# Patient Record
Sex: FEMALE | Race: BLACK OR AFRICAN AMERICAN | NOT HISPANIC OR LATINO | Employment: OTHER | URBAN - METROPOLITAN AREA
[De-identification: names, ages, dates, MRNs, and addresses within clinical notes are randomized per-mention and may not be internally consistent; named-entity substitution may affect disease eponyms.]

---

## 2023-04-05 ENCOUNTER — HOSPITAL ENCOUNTER (OUTPATIENT)
Dept: DATA CONVERSION | Facility: HOSPITAL | Age: 88
End: 2023-04-05
Attending: OPHTHALMOLOGY | Admitting: OPHTHALMOLOGY
Payer: COMMERCIAL

## 2023-04-05 DIAGNOSIS — Z86.19 PERSONAL HISTORY OF OTHER INFECTIOUS AND PARASITIC DISEASES: ICD-10-CM

## 2023-04-05 DIAGNOSIS — Z85.038 PERSONAL HISTORY OF OTHER MALIGNANT NEOPLASM OF LARGE INTESTINE: ICD-10-CM

## 2023-04-05 DIAGNOSIS — Z85.3 PERSONAL HISTORY OF MALIGNANT NEOPLASM OF BREAST: ICD-10-CM

## 2023-04-05 DIAGNOSIS — F41.9 ANXIETY DISORDER, UNSPECIFIED: ICD-10-CM

## 2023-04-05 DIAGNOSIS — I10 ESSENTIAL (PRIMARY) HYPERTENSION: ICD-10-CM

## 2023-04-05 DIAGNOSIS — I25.2 OLD MYOCARDIAL INFARCTION: ICD-10-CM

## 2023-04-05 DIAGNOSIS — E78.5 HYPERLIPIDEMIA, UNSPECIFIED: ICD-10-CM

## 2023-04-05 DIAGNOSIS — E44.0 MODERATE PROTEIN-CALORIE MALNUTRITION (MULTI): ICD-10-CM

## 2023-04-05 DIAGNOSIS — H26.9 UNSPECIFIED CATARACT: ICD-10-CM

## 2023-04-05 DIAGNOSIS — I25.10 ATHEROSCLEROTIC HEART DISEASE OF NATIVE CORONARY ARTERY WITHOUT ANGINA PECTORIS: ICD-10-CM

## 2023-04-05 DIAGNOSIS — R42 DIZZINESS AND GIDDINESS: ICD-10-CM

## 2023-04-05 DIAGNOSIS — Z87.891 PERSONAL HISTORY OF NICOTINE DEPENDENCE: ICD-10-CM

## 2023-04-05 DIAGNOSIS — R74.01 ELEVATION OF LEVELS OF LIVER TRANSAMINASE LEVELS: ICD-10-CM

## 2023-04-05 DIAGNOSIS — I48.91 UNSPECIFIED ATRIAL FIBRILLATION (MULTI): ICD-10-CM

## 2023-04-05 DIAGNOSIS — H25.811 COMBINED FORMS OF AGE-RELATED CATARACT, RIGHT EYE: ICD-10-CM

## 2023-09-06 VITALS
DIASTOLIC BLOOD PRESSURE: 80 MMHG | TEMPERATURE: 99.3 F | SYSTOLIC BLOOD PRESSURE: 139 MMHG | HEART RATE: 110 BPM | RESPIRATION RATE: 17 BRPM

## 2023-09-14 NOTE — H&P
History of Present Illness:   History Present Illness:  Reason for surgery: Cataract, right eye   HPI:    Ms. Luis is a 89 y/o woman with a hx of visually significant  cataract, right eye, presenting for cataract extraction with IOL placement, right eye      Allergies:        Allergies:  ·  No Known Allergies :     Home Medication Review:   Home Medications Reviewed: yes     Impression/Procedure:   ·  Impression and Planned Procedure: hx of visually significant cataract, right eye, presenting for cataract extraction with IOL placement, right eye       ERAS (Enhanced Recovery After Surgery):  ·  ERAS Patient: no       Vital Signs:  Temperature C: 37.4 degrees C   Temperature F: 99.3 degrees F   Heart Rate: 110 beats per minute   Respiratory Rate: 17 breath per minute   Blood Pressure Systolic: 139 mm/Hg   Blood Pressure Diastolic: 80 mm/Hg     Physical Exam by System:    Constitutional: Well developed, no distress, alert  and cooperative   Respiratory/Thorax: Patent airways, good chest expansion,  thorax symmetric   Cardiovascular: Regular, rate and rhythm     Consent:   COVID-19 Consent:  ·  COVID-19 Risk Consent Surgeon has reviewed key risks related to the risk of mackenzie COVID-19 and if they contract COVID-19 what the risks are.     Attestation:   Note Completion:  I am a:  Resident/Fellow   Attending Attestation I saw and evaluated the patient.  I personally obtained the key and critical portions of the history and physical exam or was physically present for key and  critical portions performed by the resident/fellow. I reviewed the resident/fellow?s documentation and discussed the patient with the resident/fellow.  I agree with the resident/fellow?s medical decision making as documented in the note.     I personally evaluated the patient on 05-Apr-2023         Electronic Signatures:  Lynette Aguilar (Resident))  (Signed 05-Apr-2023 13:48)   Authored: History of Present Illness, Allergies, Home   Medication Review, Impression/Procedure, ERAS, Physical Exam, Consent, Note Completion  Fady Goel)  (Signed 05-Apr-2023 15:03)   Authored: Physical Exam, Note Completion   Co-Signer: History of Present Illness, Allergies, Home Medication Review, Impression/Procedure, ERAS, Physical Exam, Consent, Note Completion      Last Updated: 05-Apr-2023 15:03 by Fady Goel)

## 2023-09-18 ENCOUNTER — OFFICE VISIT (OUTPATIENT)
Dept: PRIMARY CARE | Facility: CLINIC | Age: 88
End: 2023-09-18
Payer: COMMERCIAL

## 2023-09-18 ENCOUNTER — LAB (OUTPATIENT)
Dept: LAB | Facility: LAB | Age: 88
End: 2023-09-18
Payer: COMMERCIAL

## 2023-09-18 VITALS
SYSTOLIC BLOOD PRESSURE: 149 MMHG | TEMPERATURE: 98 F | OXYGEN SATURATION: 98 % | DIASTOLIC BLOOD PRESSURE: 84 MMHG | HEIGHT: 64 IN | BODY MASS INDEX: 21.72 KG/M2 | HEART RATE: 76 BPM | WEIGHT: 127.2 LBS

## 2023-09-18 DIAGNOSIS — R20.0 NUMBNESS IN FEET: ICD-10-CM

## 2023-09-18 DIAGNOSIS — Z23 ENCOUNTER FOR IMMUNIZATION: Primary | ICD-10-CM

## 2023-09-18 DIAGNOSIS — I10 HYPERTENSION, UNSPECIFIED TYPE: ICD-10-CM

## 2023-09-18 LAB — COBALAMIN (VITAMIN B12) (PG/ML) IN SER/PLAS: 325 PG/ML (ref 211–911)

## 2023-09-18 PROCEDURE — 3077F SYST BP >= 140 MM HG: CPT

## 2023-09-18 PROCEDURE — 90662 IIV NO PRSV INCREASED AG IM: CPT

## 2023-09-18 PROCEDURE — 36415 COLL VENOUS BLD VENIPUNCTURE: CPT

## 2023-09-18 PROCEDURE — 99213 OFFICE O/P EST LOW 20 MIN: CPT

## 2023-09-18 PROCEDURE — 1126F AMNT PAIN NOTED NONE PRSNT: CPT

## 2023-09-18 PROCEDURE — 82607 VITAMIN B-12: CPT

## 2023-09-18 PROCEDURE — 3079F DIAST BP 80-89 MM HG: CPT

## 2023-09-18 PROCEDURE — 1036F TOBACCO NON-USER: CPT

## 2023-09-18 PROCEDURE — 90471 IMMUNIZATION ADMIN: CPT

## 2023-09-18 PROCEDURE — 1159F MED LIST DOCD IN RCRD: CPT

## 2023-09-18 RX ORDER — AMLODIPINE BESYLATE 5 MG/1
5 TABLET ORAL DAILY
Qty: 90 TABLET | Refills: 11 | Status: SHIPPED | OUTPATIENT
Start: 2023-09-18 | End: 2023-09-18

## 2023-09-18 RX ORDER — AMLODIPINE BESYLATE 5 MG/1
1 TABLET ORAL DAILY
COMMUNITY
Start: 2020-08-19 | End: 2023-09-18 | Stop reason: SDUPTHER

## 2023-09-18 ASSESSMENT — PAIN SCALES - GENERAL: PAINLEVEL: 0-NO PAIN

## 2023-09-18 NOTE — PROGRESS NOTES
".Subjective   Patient ID: Jojo Luis is a 89 y.o. female who presents for Follow-up (Med refill).    HPI     # HTN   - IO BP today is 149/84   - previously controlled on amlodipine 5 mg   - per pt she ran out of her mediation 1.5 weeks ago as she was not able to get a refill   - Denies cp, SOB, dizziness, change in vision, headache, n/v, or changes in urination.     # b/l lower extremity edema   - pt complains of some mild edema at ankles. She states that she used to wear compression stockings but does not wear them anymore. Her daughter will get her some. Pt defers from getting a prescription.   - She denies pain but does endorse some numbness at bilateral ankles.     Review of Systems  12 point ROS negative except as stated in HPI.     Objective   /84   Pulse 76   Temp 36.7 °C (98 °F) (Temporal)   Ht 1.626 m (5' 4\")   Wt 57.7 kg (127 lb 3.2 oz)   SpO2 98%   BMI 21.83 kg/m²     Physical Exam  Gen: NAD, nontox  HEENT: NCAT. EOMI. MMM.  RESP: no resp distress, talks in full sentences, CTABL  CVS: non-tachycardic, RRR  ABD: Soft, non-distended  Psych: appropriately answers questions. normal mood and affect  MSK: appears to have Full range of motion on all extremities. Mild non-pitting edema at bilateral ankles, no skin changes appreciated.       Assessment/Plan   87 years old Female with PMHx significant for previously well controlled HTN who comes to the clinic for elevated BP after not having her amlodipine for 1.5 weeks. Pt is asymptomatic but she does complain of some dependent edema bilaterally likely 2/2 vascular insufficiency.     # HTN  - refilled amlodipine 5mg   - counseled pt on taking her blood pressure 2x in the morning and at night before her medications and bringing the recordings to her next appointment.     #vascular insufficiency   # mild peripheral neuropathy at b/l lateral malleolus.   - encouraged the use of compression stockings. Pt prefers to buy them herself instead of getting a " prescription.   - counseled the patient on elevating her legs when sitting or sleeping.   - B12 level obtained     #HM   - flu shot administered.     Seen and discussed with Dr. Sierra Saha MD, MPH   Family Medicine and Preventive Health, PGY-2

## 2023-09-18 NOTE — PATIENT INSTRUCTIONS
Thank you for coming in to see us today, Ms. Jojo Luis! It was a pleasure managing your health together.    Today in clinic, we discussed your blood pressure which was elevated. However you have not had your BP meds in 1.5 weeks.     If we ordered bloodwork today, you can get this done at ANY  location and the results will come to me directly. The [facility] here at [facility] are walk-in (no appointment needed); [facility]'s hours are M-F 6:30a-6p and Sat 8a-12p.    If you have any questions/concerns, you can always use i[employer] to message me directly. Or if you prefer, you can call the office at [phone]; if you leave a message, the office staff should translate this to a message in my inbox.    I'm looking forward to seeing you back in clinic in 3 months to discuss your health maintenance.    Best,  Dr. Saha

## 2023-09-29 NOTE — PROGRESS NOTES
I reviewed the resident's documentation and discussed the patient with the resident. I agree with the resident's medical decision making as documented in the note.     Cielo Esquivel MD

## 2023-10-02 NOTE — OP NOTE
Post Operative Note:     PreOp Diagnosis: Cataract, right eye   Post-Procedure Diagnosis: Cataract, right eye   Procedure: 1. Cataract extraction and PCIOL insertion,  right eye   Surgeon: Dr. Goel   Resident/Fellow/Other Assistant: Dr. Aguilar   Anesthesia: MAC   Estimated Blood Loss (mL): none   Specimen: no   Complications: None   Findings: Cataract, right eye, PCIOL in proper position   Patient Returned To/Condition: PACU/Good     Operative Report Dictated:  Dictation: not applicable - note contains Operative  Report    Operative Report:    The patient was placed in the supine position on the operating room table where appropriate blood pressure and cardiac monitoring were initiated. The patient was  prepped and draped in the usual sterile fashion for intraocular surgery. This included instillation of Betadine 5% onto the ocular surface followed by irrigation with balance salt solution a minute or two later. A lid speculum was placed and the operating  microscope was positioned. One paracentesis stab incision was made to the left of the planned cataract incision with a 15-degree supersharp blade. 1 ml of preservative free lidocaine was injected into the AC. Viscoat was used to replace the aqueous humor.  A temporal clear corneal wound was fashioned beginning at the limbus with a 2.2 mm keratome, extending 2 mm into clear cornea before entering the anterior chamber. A continuous tear circular capsulorhexis of approximately 5 mm in diameter was performed.  Hydrodissection was performed using a Munoz canula. The endothelium was coated with viscoat again. Using the Ozil handpiece on the Asseta Lens Removal System, the nucleus was emulsified and aspirated using a divide-and-conquer technique. Residual cortex  was removed from the eye with the irrigation/aspiration bimanually. ProVisc was used to inflate the capsular bag. The lens implant was inspected and found to be free of visible defects. The lens used was  an Seven model AU00T0, power 21.5 diopter intraocular  lens. The lens was inserted into the capsular bag using the Broadview insertion mechanism. The lens was centered with a Slade hook. Residual viscoelastic was removed from the eye with the irrigation/aspiration instrument. Preservative free moxifloxacin  was injected  intracameral. The wounds were checked and found to be watertight. The lid speculum was removed and the eye was dressed with Maxitrol ointment, eye pad, tape, and shield. The patient tolerated the procedure well, and there were no complications.        Attestation:   Note Completion:  I am a: Resident/Fellow   Attending Attestation I was present for the entire procedure          Electronic Signatures:  Lynette Aguilar (Resident))  (Signed 05-Apr-2023 15:04)   Authored: Post Operative Note, Note Completion  Fady Goel)  (Signed 05-Apr-2023 15:05)   Authored: Post Operative Note, Note Completion   Co-Signer: Post Operative Note, Note Completion      Last Updated: 05-Apr-2023 15:05 by Fady Goel)

## 2023-10-24 PROBLEM — Z17.1 MALIGNANT NEOPLASM OF UPPER-OUTER QUADRANT OF RIGHT BREAST IN FEMALE, ESTROGEN RECEPTOR NEGATIVE (MULTI): Status: ACTIVE | Noted: 2023-10-24

## 2023-10-24 PROBLEM — H35.3130 AGE-RELATED MACULAR DEGENERATION, DRY, BOTH EYES: Status: ACTIVE | Noted: 2023-10-24

## 2023-10-24 PROBLEM — Z79.899 HIGH RISK MEDICATION USE: Status: ACTIVE | Noted: 2023-10-24

## 2023-10-24 PROBLEM — F41.9 ANXIETY DISORDER: Status: ACTIVE | Noted: 2023-10-24

## 2023-10-24 PROBLEM — H26.20 COMPLICATED CATARACT: Status: ACTIVE | Noted: 2023-10-24

## 2023-10-24 PROBLEM — H35.3221 EXUDATIVE AGE-RELATED MACULAR DEGENERATION, LEFT EYE, WITH ACTIVE CHOROIDAL NEOVASCULARIZATION (MULTI): Status: ACTIVE | Noted: 2023-10-24

## 2023-10-24 PROBLEM — H52.7 REFRACTIVE ERROR: Status: ACTIVE | Noted: 2023-10-24

## 2023-10-24 PROBLEM — I65.23 ASYMPTOMATIC BILATERAL CAROTID ARTERY STENOSIS: Status: ACTIVE | Noted: 2023-10-24

## 2023-10-24 PROBLEM — H25.811 COMBINED FORM OF AGE-RELATED CATARACT, RIGHT EYE: Status: ACTIVE | Noted: 2023-10-24

## 2023-10-24 PROBLEM — H52.203 ASTIGMATISM OF BOTH EYES WITH PRESBYOPIA: Status: ACTIVE | Noted: 2023-10-24

## 2023-10-24 PROBLEM — H91.93 BILATERAL HEARING LOSS: Status: ACTIVE | Noted: 2023-10-24

## 2023-10-24 PROBLEM — Z96.1 PSEUDOPHAKIA OF BOTH EYES: Status: ACTIVE | Noted: 2023-10-24

## 2023-10-24 PROBLEM — G89.29 CHRONIC PAIN: Status: ACTIVE | Noted: 2023-10-24

## 2023-10-24 PROBLEM — H90.6 MIXED HEARING LOSS, BILATERAL: Status: ACTIVE | Noted: 2023-10-24

## 2023-10-24 PROBLEM — B18.2 CHRONIC HEPATITIS C (MULTI): Status: ACTIVE | Noted: 2023-10-24

## 2023-10-24 PROBLEM — H25.812 COMBINED FORM OF AGE-RELATED CATARACT, LEFT EYE: Status: ACTIVE | Noted: 2023-10-24

## 2023-10-24 PROBLEM — H90.3 BILATERAL SENSORINEURAL HEARING LOSS: Status: ACTIVE | Noted: 2023-10-24

## 2023-10-24 PROBLEM — H52.4 ASTIGMATISM OF BOTH EYES WITH PRESBYOPIA: Status: ACTIVE | Noted: 2023-10-24

## 2023-10-24 PROBLEM — I10 HTN (HYPERTENSION): Status: ACTIVE | Noted: 2023-10-24

## 2023-10-24 PROBLEM — E78.5 HYPERLIPIDEMIA: Status: ACTIVE | Noted: 2023-10-24

## 2023-10-24 PROBLEM — C50.411 MALIGNANT NEOPLASM OF UPPER-OUTER QUADRANT OF RIGHT BREAST IN FEMALE, ESTROGEN RECEPTOR NEGATIVE (MULTI): Status: ACTIVE | Noted: 2023-10-24

## 2023-10-24 PROBLEM — I80.8 THROMBOPHLEBITIS ARM: Status: ACTIVE | Noted: 2023-10-24

## 2023-10-24 PROBLEM — H43.819 POSTERIOR VITREOUS DEGENERATION: Status: ACTIVE | Noted: 2023-10-24

## 2023-10-24 PROBLEM — H90.5 RIGHT-SIDED SENSORINEURAL HEARING LOSS: Status: ACTIVE | Noted: 2023-10-24

## 2023-10-24 RX ORDER — ACETAMINOPHEN AND CODEINE PHOSPHATE 300; 30 MG/1; MG/1
TABLET ORAL 2 TIMES DAILY
COMMUNITY
Start: 2018-04-13

## 2023-10-24 RX ORDER — LORAZEPAM 0.5 MG/1
0.5 TABLET ORAL EVERY 12 HOURS
COMMUNITY

## 2023-10-24 RX ORDER — ASPIRIN 81 MG
5 TABLET, DELAYED RELEASE (ENTERIC COATED) ORAL 2 TIMES DAILY
COMMUNITY
Start: 2022-09-12

## 2023-10-25 ENCOUNTER — OFFICE VISIT (OUTPATIENT)
Dept: OPHTHALMOLOGY | Facility: CLINIC | Age: 88
End: 2023-10-25
Payer: COMMERCIAL

## 2023-10-25 DIAGNOSIS — H35.3221 EXUDATIVE AGE-RELATED MACULAR DEGENERATION, LEFT EYE, WITH ACTIVE CHOROIDAL NEOVASCULARIZATION (MULTI): Primary | ICD-10-CM

## 2023-10-25 DIAGNOSIS — H35.3131 EARLY DRY STAGE NONEXUDATIVE AGE-RELATED MACULAR DEGENERATION OF BOTH EYES: ICD-10-CM

## 2023-10-25 PROCEDURE — 92134 CPTRZ OPH DX IMG PST SGM RTA: CPT | Mod: BILATERAL PROCEDURE | Performed by: OPHTHALMOLOGY

## 2023-10-25 PROCEDURE — 99214 OFFICE O/P EST MOD 30 MIN: CPT | Performed by: OPHTHALMOLOGY

## 2023-10-25 PROCEDURE — 67028 INJECTION EYE DRUG: CPT | Mod: LEFT SIDE | Performed by: OPHTHALMOLOGY

## 2023-10-25 ASSESSMENT — VISUAL ACUITY
OS_CC: 20/60
METHOD: SNELLEN - LINEAR
OD_CC: 20/20
CORRECTION_TYPE: GLASSES

## 2023-10-25 ASSESSMENT — CUP TO DISC RATIO
OS_RATIO: 0.2
OD_RATIO: 0.2

## 2023-10-25 ASSESSMENT — EXTERNAL EXAM - LEFT EYE: OS_EXAM: NORMAL

## 2023-10-25 ASSESSMENT — ENCOUNTER SYMPTOMS
CONSTITUTIONAL NEGATIVE: 0
MUSCULOSKELETAL NEGATIVE: 0
ALLERGIC/IMMUNOLOGIC NEGATIVE: 0
RESPIRATORY NEGATIVE: 0
ENDOCRINE NEGATIVE: 0
PSYCHIATRIC NEGATIVE: 0
GASTROINTESTINAL NEGATIVE: 0
EYES NEGATIVE: 0
HEMATOLOGIC/LYMPHATIC NEGATIVE: 0
CARDIOVASCULAR NEGATIVE: 0
NEUROLOGICAL NEGATIVE: 0

## 2023-10-25 ASSESSMENT — SLIT LAMP EXAM - LIDS
COMMENTS: NORMAL
COMMENTS: NORMAL

## 2023-10-25 ASSESSMENT — TONOMETRY
IOP_METHOD: GOLDMANN APPLANATION
OD_IOP_MMHG: 11
OS_IOP_MMHG: 11

## 2023-10-25 ASSESSMENT — EXTERNAL EXAM - RIGHT EYE: OD_EXAM: NORMAL

## 2023-10-25 NOTE — PROGRESS NOTES
OCT Macula    OD: normal foveal depression, no evidence of retinal edema, + RPE disruption and pigment migration         OS: minimal disruption of foveal contour with partially collapsed ped, improving mild but still present subretinal fluid (SRF), CST improved    Dry AMD OD  -Dry and stable, no evidence of fluid as before  -PCIOL in good position, well centered okay   -Pt encouraged to take AREDS2    Wet AMD OS   - Discussed with pt that we will tighten interval for treat and extend protocol. Improving SRF on OCT macula OS today s/p FRANKLYN OS 8/9/23  - R/B/A of REPEAT FRANKLYN OS discussed. Patient wishes to proceed. FRANKLYN OS again performed today, procedure note to follow.  - Continue amsler monitoring  - f/u in 2 months

## 2023-12-20 ENCOUNTER — PHARMACY VISIT (OUTPATIENT)
Dept: PHARMACY | Facility: CLINIC | Age: 88
End: 2023-12-20
Payer: MEDICAID

## 2023-12-20 PROCEDURE — RXMED WILLOW AMBULATORY MEDICATION CHARGE

## 2023-12-26 NOTE — PROGRESS NOTES
Assessment/Plan   Diagnoses and all orders for this visit:  Exudative age-related macular degeneration, left eye, with active choroidal neovascularization (CMS/HCC)  Early dry stage nonexudative age-related macular degeneration of right eye  Intermediate stage nonexudative age-related macular degeneration of right eye    OCT Macula 12/27/23    OD: normal foveal depression, no evidence of retinal edema, focal + RPE disruption and pigment migration         OS: Abnormal foveal contour, subfoveal broad partially collapsed PED with overlying subretinal fluid (SRF)- mildly improved    Wet AMD OS  H35.3221  - s/p FRANKLYN OS 08/09/23, 10/25/23, 12/27/23  - Discussed with pt that we will tighten interval for treat and extend protocol. Improving SRF on OCT macula OS today  -r/b of FRANKLYN discussed today, patient wished to proceed  - Eylea given today (12/27/23) , tolerated well   - Continue amsler monitoring  - will again try to shorten interval to 6-8 weeks    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

## 2023-12-27 ENCOUNTER — OFFICE VISIT (OUTPATIENT)
Dept: OPHTHALMOLOGY | Facility: CLINIC | Age: 88
End: 2023-12-27
Payer: COMMERCIAL

## 2023-12-27 DIAGNOSIS — H35.3221 EXUDATIVE AGE-RELATED MACULAR DEGENERATION, LEFT EYE, WITH ACTIVE CHOROIDAL NEOVASCULARIZATION (MULTI): Primary | ICD-10-CM

## 2023-12-27 DIAGNOSIS — H35.3112 INTERMEDIATE STAGE NONEXUDATIVE AGE-RELATED MACULAR DEGENERATION OF RIGHT EYE: ICD-10-CM

## 2023-12-27 DIAGNOSIS — H35.3111 EARLY DRY STAGE NONEXUDATIVE AGE-RELATED MACULAR DEGENERATION OF RIGHT EYE: ICD-10-CM

## 2023-12-27 PROCEDURE — 92134 CPTRZ OPH DX IMG PST SGM RTA: CPT | Mod: BILATERAL PROCEDURE | Performed by: OPHTHALMOLOGY

## 2023-12-27 PROCEDURE — 67028 INJECTION EYE DRUG: CPT | Mod: LEFT SIDE | Performed by: OPHTHALMOLOGY

## 2023-12-27 PROCEDURE — 99213 OFFICE O/P EST LOW 20 MIN: CPT | Performed by: OPHTHALMOLOGY

## 2023-12-27 ASSESSMENT — VISUAL ACUITY
OS_CC: 20/50+2
METHOD: SNELLEN - LINEAR
CORRECTION_TYPE: GLASSES
OD_CC: 20/20-2

## 2023-12-27 ASSESSMENT — CUP TO DISC RATIO
OS_RATIO: 0.2
OD_RATIO: 0.2

## 2023-12-27 ASSESSMENT — TONOMETRY
IOP_METHOD: GOLDMANN APPLANATION
OD_IOP_MMHG: 12
OS_IOP_MMHG: 12

## 2023-12-27 ASSESSMENT — EXTERNAL EXAM - LEFT EYE: OS_EXAM: NORMAL

## 2023-12-27 ASSESSMENT — SLIT LAMP EXAM - LIDS
COMMENTS: NORMAL
COMMENTS: NORMAL

## 2023-12-27 ASSESSMENT — EXTERNAL EXAM - RIGHT EYE: OD_EXAM: NORMAL

## 2024-01-13 ENCOUNTER — PHARMACY VISIT (OUTPATIENT)
Dept: PHARMACY | Facility: CLINIC | Age: 89
End: 2024-01-13
Payer: MEDICAID

## 2024-01-13 ENCOUNTER — HOSPITAL ENCOUNTER (EMERGENCY)
Facility: HOSPITAL | Age: 89
Discharge: HOME | End: 2024-01-13
Payer: COMMERCIAL

## 2024-01-13 VITALS
HEIGHT: 64 IN | RESPIRATION RATE: 16 BRPM | BODY MASS INDEX: 21.68 KG/M2 | TEMPERATURE: 98.1 F | WEIGHT: 127 LBS | SYSTOLIC BLOOD PRESSURE: 118 MMHG | HEART RATE: 93 BPM | OXYGEN SATURATION: 96 % | DIASTOLIC BLOOD PRESSURE: 78 MMHG

## 2024-01-13 DIAGNOSIS — H61.23 BILATERAL IMPACTED CERUMEN: Primary | ICD-10-CM

## 2024-01-13 PROCEDURE — 99283 EMERGENCY DEPT VISIT LOW MDM: CPT

## 2024-01-13 PROCEDURE — 69209 REMOVE IMPACTED EAR WAX UNI: CPT | Mod: 50

## 2024-01-13 PROCEDURE — RXMED WILLOW AMBULATORY MEDICATION CHARGE

## 2024-01-13 PROCEDURE — 99283 EMERGENCY DEPT VISIT LOW MDM: CPT | Performed by: NURSE PRACTITIONER

## 2024-01-13 PROCEDURE — 99282 EMERGENCY DEPT VISIT SF MDM: CPT

## 2024-01-13 RX ORDER — ASPIRIN 81 MG
5 TABLET, DELAYED RELEASE (ENTERIC COATED) ORAL 2 TIMES DAILY
Qty: 15 ML | Refills: 0 | OUTPATIENT
Start: 2024-01-13 | End: 2024-02-12

## 2024-01-13 ASSESSMENT — COLUMBIA-SUICIDE SEVERITY RATING SCALE - C-SSRS
2. HAVE YOU ACTUALLY HAD ANY THOUGHTS OF KILLING YOURSELF?: NO
1. IN THE PAST MONTH, HAVE YOU WISHED YOU WERE DEAD OR WISHED YOU COULD GO TO SLEEP AND NOT WAKE UP?: NO
6. HAVE YOU EVER DONE ANYTHING, STARTED TO DO ANYTHING, OR PREPARED TO DO ANYTHING TO END YOUR LIFE?: NO

## 2024-01-13 ASSESSMENT — PAIN DESCRIPTION - ORIENTATION: ORIENTATION: RIGHT

## 2024-01-13 ASSESSMENT — LIFESTYLE VARIABLES
REASON UNABLE TO ASSESS: NO
HAVE PEOPLE ANNOYED YOU BY CRITICIZING YOUR DRINKING: NO
EVER FELT BAD OR GUILTY ABOUT YOUR DRINKING: NO
EVER HAD A DRINK FIRST THING IN THE MORNING TO STEADY YOUR NERVES TO GET RID OF A HANGOVER: NO
HAVE YOU EVER FELT YOU SHOULD CUT DOWN ON YOUR DRINKING: NO

## 2024-01-13 ASSESSMENT — PAIN DESCRIPTION - LOCATION: LOCATION: EAR

## 2024-01-13 ASSESSMENT — PAIN DESCRIPTION - PAIN TYPE: TYPE: ACUTE PAIN

## 2024-01-13 ASSESSMENT — PAIN SCALES - GENERAL: PAINLEVEL_OUTOF10: 4

## 2024-01-13 ASSESSMENT — PAIN - FUNCTIONAL ASSESSMENT: PAIN_FUNCTIONAL_ASSESSMENT: 0-10

## 2024-01-13 ASSESSMENT — PAIN DESCRIPTION - PROGRESSION: CLINICAL_PROGRESSION: NOT CHANGED

## 2024-01-13 NOTE — ED PROVIDER NOTES
"HPI   No chief complaint on file.      HPI    This is a very pleasant 89 year old with a history significant for chronic bilateral cerumen impaction among other chronic conditions presented to the ED today with \"bleeding from the right ear\" and difficulty hearing.  She has been using a drop to help with cerumen impaction which does help but over the course of the last two days, she has been poking in the right ear to get it cleared out and saw some blood when stuck a tissue in her ear. Had some ear pain last night but not now.   She has no headache or any URI symptoms. No fever and/or chills.                   Nely Coma Scale Score: 15                  Patient History   Past Medical History:   Diagnosis Date    Abnormal electrocardiogram (ECG) (EKG) 07/02/2013    Abnormal electrocardiogram    Biliuria 04/10/2017    Bilirubin in urine    Cerebral cysts 07/02/2013    Arachnoid cyst    Diverticulosis of intestine, part unspecified, without perforation or abscess without bleeding 07/02/2013    Diverticulosis    Impacted cerumen, bilateral 01/29/2018    Bilateral impacted cerumen    Low back pain, unspecified 04/10/2017    Low back pain    Non-ST elevation (NSTEMI) myocardial infarction (CMS/HCC) 04/10/2017    NSTEMI (non-ST elevated myocardial infarction)    Other specified diseases of anus and rectum 07/02/2013    Rectal pain    Pain in unspecified shoulder 04/10/2017    Chronic shoulder pain    Panic disorder (episodic paroxysmal anxiety) 07/02/2013    Panic disorder without agoraphobia    Personal history of other diseases of the digestive system 06/16/2021    History of small bowel obstruction    Personal history of other malignant neoplasm of large intestine     History of malignant neoplasm of colon    Personal history of other specified conditions 07/02/2013    History of insomnia    Tachycardia, unspecified 04/10/2017    Tachycardia    Unspecified osteoarthritis, unspecified site 04/10/2017    Arthritis "     Past Surgical History:   Procedure Laterality Date    CHOLECYSTECTOMY  07/02/2015    Cholecystectomy    CT ANGIO NECK  9/13/2017    CT NECK ANGIO W AND WO IV CONTRAST 9/13/2017 [facility]    EXPLORATORY LAPAROTOMY  07/02/2015    Exploratory Laparotomy    HYSTERECTOMY  07/02/2013    Hysterectomy    MR HEAD ANGIO WO IV CONTRAST  8/12/2022    MR HEAD ANGIO WO IV CONTRAST 8/12/2022 [facility]    MR NECK ANGIO WO IV CONTRAST  8/12/2022    MR NECK ANGIO WO IV CONTRAST 8/12/2022 [facility]    OTHER SURGICAL HISTORY  06/03/2015    Right Hemicolectomy    VENTRAL HERNIA REPAIR  07/06/2015    Ventral Hernia Repair     No family history on file.  Social History     Tobacco Use    Smoking status: Never     Passive exposure: Never    Smokeless tobacco: Never   Substance Use Topics    Alcohol use: Never    Drug use: Never       Physical Exam   ED Triage Vitals [01/13/24 1001]   Temp Heart Rate Resp BP   36.7 °C (98.1 °F) 93 16 118/78      SpO2 Temp Source Heart Rate Source Patient Position   96 % Tympanic -- --      BP Location FiO2 (%)     -- --       Physical Exam  Constitutional:       Appearance: Normal appearance.   HENT:      Head: Normocephalic and atraumatic.      Right Ear: Tympanic membrane, ear canal and external ear normal. There is impacted cerumen.      Left Ear: Tympanic membrane, ear canal and external ear normal. There is impacted cerumen.      Nose: Nose normal.   Eyes:      Extraocular Movements: Extraocular movements intact.      Pupils: Pupils are equal, round, and reactive to light.   Cardiovascular:      Rate and Rhythm: Normal rate and regular rhythm.   Pulmonary:      Effort: Pulmonary effort is normal.      Breath sounds: Normal breath sounds.   Musculoskeletal:         General: Normal range of motion.   Skin:     General: Skin is warm and dry.   Neurological:      General: No focal deficit present.      Mental Status: She is alert and oriented to person, place, and time.  "  Psychiatric:         Mood and Affect: Mood normal.         Behavior: Behavior normal.         ED Course & MDM   Diagnoses as of 01/13/24 1033   Bilateral impacted cerumen       Medical Decision Making  This is a very pleasant 89 year old with a history significant for chronic bilateral cerumen impaction among other chronic conditions presented to the ED today with \"bleeding from the right ear\" and difficulty hearing.  She has been using a drop to help with cerumen impaction which does help but over the course of the last two days, she has been poking in the right ear to get it cleared out and saw some blood when stuck a tissue in her ear. Had some ear pain last night but not now.   Both ears were flushed with a large amount of cerumen out of the left ear and moderate from the right ear. There was NO bleeding, no sign of infection or inflammation in either ear. She was able to hear significantly better after flushing of the ears.   Recommended Debrox at home and follow up with ENT.     She         Procedure  Procedures     Dinah Frias, APRN-CNP, DNP  01/13/24 1047    "

## 2024-02-08 ENCOUNTER — OFFICE VISIT (OUTPATIENT)
Dept: PRIMARY CARE | Facility: CLINIC | Age: 89
End: 2024-02-08
Payer: COMMERCIAL

## 2024-02-08 ENCOUNTER — PHARMACY VISIT (OUTPATIENT)
Dept: PHARMACY | Facility: CLINIC | Age: 89
End: 2024-02-08
Payer: MEDICAID

## 2024-02-08 VITALS
BODY MASS INDEX: 21.54 KG/M2 | DIASTOLIC BLOOD PRESSURE: 81 MMHG | HEART RATE: 96 BPM | HEIGHT: 64 IN | TEMPERATURE: 97.1 F | WEIGHT: 126.2 LBS | SYSTOLIC BLOOD PRESSURE: 125 MMHG | OXYGEN SATURATION: 95 %

## 2024-02-08 DIAGNOSIS — Z00.00 HEALTHCARE MAINTENANCE: Primary | ICD-10-CM

## 2024-02-08 DIAGNOSIS — R52 COMPLAINTS OF TOTAL BODY PAIN: ICD-10-CM

## 2024-02-08 DIAGNOSIS — I10 HYPERTENSION, UNSPECIFIED TYPE: ICD-10-CM

## 2024-02-08 PROCEDURE — 1126F AMNT PAIN NOTED NONE PRSNT: CPT

## 2024-02-08 PROCEDURE — 3079F DIAST BP 80-89 MM HG: CPT

## 2024-02-08 PROCEDURE — 1159F MED LIST DOCD IN RCRD: CPT

## 2024-02-08 PROCEDURE — RXMED WILLOW AMBULATORY MEDICATION CHARGE

## 2024-02-08 PROCEDURE — 1036F TOBACCO NON-USER: CPT

## 2024-02-08 PROCEDURE — 99397 PER PM REEVAL EST PAT 65+ YR: CPT

## 2024-02-08 PROCEDURE — 3074F SYST BP LT 130 MM HG: CPT

## 2024-02-08 RX ORDER — ACETAMINOPHEN 325 MG/1
650 TABLET ORAL EVERY 6 HOURS PRN
Qty: 30 TABLET | Refills: 0 | Status: SHIPPED | OUTPATIENT
Start: 2024-02-08 | End: 2024-02-18

## 2024-02-08 RX ORDER — LOSARTAN POTASSIUM 25 MG/1
25 TABLET ORAL DAILY
Qty: 30 TABLET | Refills: 11 | Status: SHIPPED | OUTPATIENT
Start: 2024-02-08 | End: 2025-02-07

## 2024-02-08 ASSESSMENT — ENCOUNTER SYMPTOMS
HEADACHES: 0
ACTIVITY CHANGE: 0
FEVER: 0
MYALGIAS: 1
ABDOMINAL DISTENTION: 0
CHILLS: 0
FATIGUE: 0
CHEST TIGHTNESS: 0
DIZZINESS: 0
APPETITE CHANGE: 0
ABDOMINAL PAIN: 0
DIAPHORESIS: 0
SHORTNESS OF BREATH: 0
ARTHRALGIAS: 1
BACK PAIN: 1

## 2024-02-08 ASSESSMENT — PAIN SCALES - GENERAL: PAINLEVEL: 0-NO PAIN

## 2024-02-08 NOTE — PATIENT INSTRUCTIONS
Thank you for coming in to see us today, Ms. Jojo Luis! It was a pleasure managing your health together.    Today in clinic, we discussed your lower leg swelling. We will stop your amlodipine because this could be causing the leg swelling. We are starting you on losartan for your blood pressure.     We will start you on tylenol for your chronic pain.     If we ordered bloodwork today, you can get this done at ANY  location and the results will come to me directly. The [facility] here at [facility] are walk-in (no appointment needed); [facility]'s hours are M-F 6:30a-6p and Sat 8a-12p.    If you have any questions/concerns, you can always use [employer] to message me directly. Or if you prefer, you can call the office at [phone]; if you leave a message, the office staff should translate this to a message in my inbox.    I'm looking forward to seeing you back in clinic in 3 months to discuss your pain.    Best,  Dr. Saha

## 2024-02-08 NOTE — PROGRESS NOTES
Adrian Luis is a 89 y.o. female presenting today for a wellness visit.     Pt reports that she is experiencing lower leg swelling that started after she began taking amlodipine 5mg for her hypertension. She says it is cumbersome and is worse when she sits down for longer periods and then resolves. Her compression stocking when she wears them do provide some relief.     She recently went to the ED for impacted ear wax and bleeding, and had both of her ears cleaned. Today she reports no symptoms of ear pain, drainage, hearing loss, or concerns with her ears.    She also reports that she has been having full body aches that are worse when she is at rest and wake her from sleep occasionally. She takes tylenol sometimes, and it helps a little bit. She thought about taking naproxen but has not taken any of it yet.     She says her mood is fine, no changes in sleep, is able to complete all of her iADLs and bADLS by herself. She done not use a walker or cane and has no history of falls. Her grand some comes by roughly once a month to visit, and she has family close by if needed. She has taken time to fall proof her home, including making sure her rugs are flat and has moved tripping hazards.     Review of Systems  Review of Systems   Constitutional:  Negative for activity change, appetite change, chills, diaphoresis, fatigue and fever.   HENT:  Negative for ear pain.    Respiratory:  Negative for chest tightness and shortness of breath.    Cardiovascular:  Negative for chest pain.   Gastrointestinal:  Negative for abdominal distention and abdominal pain.   Musculoskeletal:  Positive for arthralgias, back pain and myalgias.   Neurological:  Negative for dizziness and headaches.       Objective       9/20/2022     2:36 PM 4/5/2023     1:47 PM 9/18/2023    10:43 AM 9/18/2023    11:20 AM 1/13/2024    10:01 AM 1/13/2024    10:07 AM 2/8/2024    10:43 AM   Vitals   Systolic 126 139 149 149 118  125   Diastolic 80 80 99  "84 78  81   Heart Rate  110 80 76 93  96   Temp  37.4 °C (99.3 °F) 36.7 °C (98 °F)  36.7 °C (98.1 °F)  36.2 °C (97.1 °F)   Resp  17   16     Height (in)   1.626 m (5' 4\")   1.626 m (5' 4\") 1.626 m (5' 4\")   Weight (lb)   127.2   127 126.2   BMI   21.83 kg/m2   21.8 kg/m2 21.66 kg/m2   BSA (m2)   1.61 m2   1.61 m2 1.61 m2   Visit Report   Report Report   Report      Physical Exam  Constitutional:       General: She is not in acute distress.     Appearance: Normal appearance. She is not ill-appearing, toxic-appearing or diaphoretic.   HENT:      Head: Normocephalic.      Right Ear: Tympanic membrane, ear canal and external ear normal. There is no impacted cerumen. Tympanic membrane is not injected, scarred, perforated, erythematous, retracted or bulging.      Left Ear: Tympanic membrane, ear canal and external ear normal. There is no impacted cerumen. Tympanic membrane is not injected, scarred, perforated, erythematous, retracted or bulging.      Ears:      Comments: Minimal ear wax in canals. TM clear without effusion or erythema bilaterally      Mouth/Throat:      Mouth: Mucous membranes are moist.      Pharynx: Oropharynx is clear. No oropharyngeal exudate.   Eyes:      Extraocular Movements: Extraocular movements intact.      Conjunctiva/sclera: Conjunctivae normal.      Pupils: Pupils are equal, round, and reactive to light.   Cardiovascular:      Rate and Rhythm: Normal rate.      Heart sounds: Murmur heard.      Comments: Systolic ejection murmur 2/6 in right upper sternal border   Abdominal:      General: Abdomen is flat. There is no distension.      Palpations: Abdomen is soft. There is no mass.      Tenderness: There is no abdominal tenderness. There is no guarding.      Hernia: No hernia is present.      Comments: Midline scar from prior hysterectomy    Musculoskeletal:         General: No swelling or tenderness.      Right lower leg: No edema.      Left lower leg: No edema.      Comments: No lower " extremity edema. Pt is wearing compression stockings    Skin:     General: Skin is warm and dry.      Coloration: Skin is not jaundiced or pale.      Findings: No bruising or erythema.      Comments: No rashes on lower legs   Neurological:      General: No focal deficit present.      Mental Status: She is alert and oriented to person, place, and time. Mental status is at baseline.       Assessment/Plan   Jojo Luis is a 89 y.o. female presenting today for a wellness visit. She appears to be doing well with minimal complaints. Her lower leg swelling is likely multifactorial including venous insufficiency and a side effect of her amlodipine. Her body aches are related to her age and potential osteoporosis. The patient has never gotten a DEXA scan per chart review and upon questioning. When approached about this, she denied wanting it as she does not think she would want to take a bisphosphonate if the results said that she should. She is still independent in her bADLs and iADLs and has no history of falls. Her gait is steady and she does not use a walker or cane. She was educated on fall risk aversion and understand she needs to be vigilant about this.     Plan:   - Diagnoses and all orders for this visit:  Healthcare maintenance  Hypertension, unspecified type  Lower leg swelling   -     Follow Up In Primary Care  -  Stop amlodipine 5mg   -     Start losartan (Cozaar) 25 mg tablet; Take 1 tablet (25 mg) by mouth once daily.  Complaints of total body pain  -     acetaminophen (TylenoL) 325 mg tablet; Take 2 tablets (650 mg) by mouth every 6 hours if needed for mild pain (1 - 3) for up to 10 days.  - Avoid naproxen and other NSAIDs due to age   Bilateral Ear Cerebrum impaction   - No symptoms today, no acute interventions. Followup with ENT that is scheduled.     Discussed with Dr. Oleary,    Pradip Gordon, MS3     Nakia Saha MD, MPH   Family Medicine and Preventive Health, PGY-2    I saw and evaluated the patient  with the medical student. I personally obtained the key and critical portions of the history and physical exam. I reviewed and modified the student's documentation and discussed patient with the medical student. I agree with the above documentation and medical decision making.

## 2024-02-15 NOTE — PROGRESS NOTES
I reviewed the resident/fellow's documentation and discussed the patient with the resident/fellow. I agree with the resident/fellow's medical decision making as documented in the note.    Brian Oleary MD

## 2024-02-21 ENCOUNTER — OFFICE VISIT (OUTPATIENT)
Dept: OPHTHALMOLOGY | Facility: CLINIC | Age: 89
End: 2024-02-21
Payer: COMMERCIAL

## 2024-02-21 DIAGNOSIS — H35.3221 EXUDATIVE AGE-RELATED MACULAR DEGENERATION, LEFT EYE, WITH ACTIVE CHOROIDAL NEOVASCULARIZATION (MULTI): Primary | ICD-10-CM

## 2024-02-21 DIAGNOSIS — H35.3111 EARLY DRY STAGE NONEXUDATIVE AGE-RELATED MACULAR DEGENERATION OF RIGHT EYE: ICD-10-CM

## 2024-02-21 PROCEDURE — 92134 CPTRZ OPH DX IMG PST SGM RTA: CPT | Performed by: OPHTHALMOLOGY

## 2024-02-21 PROCEDURE — 67028 INJECTION EYE DRUG: CPT | Mod: LEFT SIDE | Performed by: OPHTHALMOLOGY

## 2024-02-21 PROCEDURE — 99213 OFFICE O/P EST LOW 20 MIN: CPT | Performed by: OPHTHALMOLOGY

## 2024-02-21 ASSESSMENT — VISUAL ACUITY
METHOD: SNELLEN - LINEAR
CORRECTION_TYPE: GLASSES
OD_CC: 20/25
OS_CC: 20/50
OS_CC+: -1

## 2024-02-21 ASSESSMENT — EXTERNAL EXAM - LEFT EYE: OS_EXAM: NORMAL

## 2024-02-21 ASSESSMENT — CONF VISUAL FIELD
OS_SUPERIOR_TEMPORAL_RESTRICTION: 0
OD_INFERIOR_TEMPORAL_RESTRICTION: 0
METHOD: COUNTING FINGERS
OS_INFERIOR_NASAL_RESTRICTION: 0
OS_NORMAL: 1
OS_INFERIOR_TEMPORAL_RESTRICTION: 0
OS_SUPERIOR_NASAL_RESTRICTION: 0
OD_SUPERIOR_TEMPORAL_RESTRICTION: 0
OD_SUPERIOR_NASAL_RESTRICTION: 0
OD_INFERIOR_NASAL_RESTRICTION: 0
OD_NORMAL: 1

## 2024-02-21 ASSESSMENT — TONOMETRY
OS_IOP_MMHG: 13
IOP_METHOD: GOLDMANN APPLANATION
OD_IOP_MMHG: 12

## 2024-02-21 ASSESSMENT — SLIT LAMP EXAM - LIDS
COMMENTS: NORMAL
COMMENTS: NORMAL

## 2024-02-21 ASSESSMENT — EXTERNAL EXAM - RIGHT EYE: OD_EXAM: NORMAL

## 2024-02-21 ASSESSMENT — CUP TO DISC RATIO
OD_RATIO: 0.2
OS_RATIO: 0.2

## 2024-02-21 NOTE — PROGRESS NOTES
Assessment/Plan       OCT Macula 02/21/24      OD: normal foveal depression, no evidence of retinal edema, focal + RPE disruption and pigment migration         OS: Abnormal foveal contour, subfoveal broad partially collapsed PED with overlying subretinal fluid (SRF)- significant improved    Wet AMD OS  H35.3221  - s/p FRANKLYN OS 08/09/23, 10/25/23, 12/27/23, 2/21/24  - Discussed with pt that we will tighten interval for treat and extend protocol. Improving SRF on OCT macula OS today  -r/b of FRANKLYN discussed today, patient wished to proceed  - Eylea given today (02/21/24) , tolerated well   - Continue amsler monitoring  - will again try to shorten interval to 8-10 weeks    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

## 2024-04-02 ENCOUNTER — PHARMACY VISIT (OUTPATIENT)
Dept: PHARMACY | Facility: CLINIC | Age: 89
End: 2024-04-02
Payer: MEDICAID

## 2024-04-02 PROCEDURE — RXMED WILLOW AMBULATORY MEDICATION CHARGE

## 2024-04-30 NOTE — PROGRESS NOTES
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 Improving SRF on OCT macula OS today  -r/b of FRANKLYN discussed today, patient wished to proceed  -Consent valid until 12/31/24  - Eylea given today (5/1/24) , tolerated well   - Continue amsler monitoring  - Follow up in 8 weeks    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

## 2024-05-01 ENCOUNTER — OFFICE VISIT (OUTPATIENT)
Dept: OPHTHALMOLOGY | Facility: CLINIC | Age: 89
End: 2024-05-01
Payer: COMMERCIAL

## 2024-05-01 DIAGNOSIS — H35.3112 INTERMEDIATE STAGE NONEXUDATIVE AGE-RELATED MACULAR DEGENERATION OF RIGHT EYE: ICD-10-CM

## 2024-05-01 DIAGNOSIS — H35.3221 EXUDATIVE AGE-RELATED MACULAR DEGENERATION, LEFT EYE, WITH ACTIVE CHOROIDAL NEOVASCULARIZATION (MULTI): Primary | ICD-10-CM

## 2024-05-01 PROCEDURE — 99214 OFFICE O/P EST MOD 30 MIN: CPT | Performed by: OPHTHALMOLOGY

## 2024-05-01 PROCEDURE — 92134 CPTRZ OPH DX IMG PST SGM RTA: CPT | Performed by: OPHTHALMOLOGY

## 2024-05-01 PROCEDURE — 67028 INJECTION EYE DRUG: CPT | Mod: LEFT SIDE | Performed by: OPHTHALMOLOGY

## 2024-05-01 ASSESSMENT — VISUAL ACUITY
OD_CC: 20/25
METHOD: SNELLEN - LINEAR
OS_CC: 20/50
CORRECTION_TYPE: GLASSES

## 2024-05-01 ASSESSMENT — EXTERNAL EXAM - LEFT EYE: OS_EXAM: NORMAL

## 2024-05-01 ASSESSMENT — TONOMETRY
OS_IOP_MMHG: 12
IOP_METHOD: GOLDMANN APPLANATION
OD_IOP_MMHG: 14

## 2024-05-01 ASSESSMENT — CUP TO DISC RATIO
OS_RATIO: 0.2
OD_RATIO: 0.2

## 2024-05-01 ASSESSMENT — EXTERNAL EXAM - RIGHT EYE: OD_EXAM: NORMAL

## 2024-05-01 ASSESSMENT — SLIT LAMP EXAM - LIDS
COMMENTS: NORMAL
COMMENTS: NORMAL

## 2024-05-16 ENCOUNTER — TELEPHONE (OUTPATIENT)
Dept: PRIMARY CARE | Facility: CLINIC | Age: 89
End: 2024-05-16
Payer: COMMERCIAL

## 2024-05-16 DIAGNOSIS — G89.29 OTHER CHRONIC PAIN: Primary | ICD-10-CM

## 2024-05-16 RX ORDER — ACETAMINOPHEN 325 MG/1
650 TABLET ORAL EVERY 6 HOURS PRN
Qty: 120 TABLET | Refills: 11 | Status: SHIPPED | OUTPATIENT
Start: 2024-05-16 | End: 2024-06-04

## 2024-05-16 NOTE — TELEPHONE ENCOUNTER
Pt. Only has 2 pills left of tylenol and needs a refill from dr. aNkia Saha. Please give pt. A call at [phone]. Thank you!

## 2024-05-17 PROCEDURE — RXMED WILLOW AMBULATORY MEDICATION CHARGE

## 2024-05-20 ENCOUNTER — PHARMACY VISIT (OUTPATIENT)
Dept: PHARMACY | Facility: CLINIC | Age: 89
End: 2024-05-20
Payer: MEDICAID

## 2024-07-02 PROCEDURE — RXMED WILLOW AMBULATORY MEDICATION CHARGE

## 2024-07-02 NOTE — PROGRESS NOTES
Assessment/Plan   Diagnoses and all orders for this visit:  Exudative age-related macular degeneration, left eye, with active choroidal neovascularization (Multi)  Early dry stage nonexudative age-related macular degeneration of right eye        OCT Macula 07/03/2024      OD: normal foveal depression, no evidence of retinal edema, focal + RPE disruption and pigment migration         OS: Abnormal foveal contour, subfoveal broad partially collapsed PED with overlying subretinal fluid (SRF)- trace edema improving    Wet AMD OS  H35.3221  - s/p FRANKLYN OS 08/09/23, 10/25/23, 12/27/23, 2/21/24, 5/1/24, 7/3/24  - Discussed with pt that we will tighten interval for treat and extend protocol. Improving SRF on OCT macula OS today  -r/b of FRANKLYN discussed today, patient wished to proceed  -Consent valid until 12/31/24  - Eylea given today (7/3/24) , tolerated well   - Continue amsler monitoring  - Follow up in 8-10 weeks    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

## 2024-07-03 ENCOUNTER — APPOINTMENT (OUTPATIENT)
Dept: OPHTHALMOLOGY | Facility: CLINIC | Age: 89
End: 2024-07-03
Payer: COMMERCIAL

## 2024-07-03 ENCOUNTER — PHARMACY VISIT (OUTPATIENT)
Dept: PHARMACY | Facility: CLINIC | Age: 89
End: 2024-07-03
Payer: MEDICAID

## 2024-07-03 DIAGNOSIS — H35.3111 EARLY DRY STAGE NONEXUDATIVE AGE-RELATED MACULAR DEGENERATION OF RIGHT EYE: ICD-10-CM

## 2024-07-03 DIAGNOSIS — H35.3221 EXUDATIVE AGE-RELATED MACULAR DEGENERATION, LEFT EYE, WITH ACTIVE CHOROIDAL NEOVASCULARIZATION (MULTI): Primary | ICD-10-CM

## 2024-07-03 PROCEDURE — 92134 CPTRZ OPH DX IMG PST SGM RTA: CPT | Performed by: OPHTHALMOLOGY

## 2024-07-03 PROCEDURE — 67028 INJECTION EYE DRUG: CPT | Mod: LEFT SIDE | Performed by: OPHTHALMOLOGY

## 2024-07-03 ASSESSMENT — SLIT LAMP EXAM - LIDS
COMMENTS: NORMAL
COMMENTS: NORMAL

## 2024-07-03 ASSESSMENT — ENCOUNTER SYMPTOMS
HEMATOLOGIC/LYMPHATIC NEGATIVE: 0
ENDOCRINE NEGATIVE: 0
RESPIRATORY NEGATIVE: 0
GASTROINTESTINAL NEGATIVE: 0
CARDIOVASCULAR NEGATIVE: 0
CONSTITUTIONAL NEGATIVE: 0
NEUROLOGICAL NEGATIVE: 0
PSYCHIATRIC NEGATIVE: 0
EYES NEGATIVE: 0
MUSCULOSKELETAL NEGATIVE: 0
ALLERGIC/IMMUNOLOGIC NEGATIVE: 0

## 2024-07-03 ASSESSMENT — VISUAL ACUITY
METHOD: SNELLEN - LINEAR
OD_CC: 20/20
OS_CC+: +1
OS_CC: 20/60
OD_CC+: -2

## 2024-07-03 ASSESSMENT — CUP TO DISC RATIO
OS_RATIO: 0.2
OD_RATIO: 0.2

## 2024-07-03 ASSESSMENT — TONOMETRY
OS_IOP_MMHG: 13
OD_IOP_MMHG: 12
IOP_METHOD: GOLDMANN APPLANATION

## 2024-07-03 ASSESSMENT — EXTERNAL EXAM - RIGHT EYE: OD_EXAM: NORMAL

## 2024-07-03 ASSESSMENT — EXTERNAL EXAM - LEFT EYE: OS_EXAM: NORMAL

## 2024-09-11 ENCOUNTER — APPOINTMENT (OUTPATIENT)
Dept: OPHTHALMOLOGY | Facility: CLINIC | Age: 89
End: 2024-09-11
Payer: COMMERCIAL

## 2024-09-11 DIAGNOSIS — H35.3221 EXUDATIVE AGE-RELATED MACULAR DEGENERATION, LEFT EYE, WITH ACTIVE CHOROIDAL NEOVASCULARIZATION (MULTI): Primary | ICD-10-CM

## 2024-09-11 PROCEDURE — 99213 OFFICE O/P EST LOW 20 MIN: CPT | Performed by: OPHTHALMOLOGY

## 2024-09-11 PROCEDURE — 67028 INJECTION EYE DRUG: CPT | Mod: LEFT SIDE | Performed by: OPHTHALMOLOGY

## 2024-09-11 PROCEDURE — 92134 CPTRZ OPH DX IMG PST SGM RTA: CPT | Performed by: OPHTHALMOLOGY

## 2024-09-11 ASSESSMENT — CUP TO DISC RATIO
OD_RATIO: 0.2
OS_RATIO: 0.2

## 2024-09-11 ASSESSMENT — VISUAL ACUITY
METHOD: SNELLEN - LINEAR
OD_CC+: -1
OS_CC: 20/60
OD_CC: 20/20

## 2024-09-11 ASSESSMENT — TONOMETRY
IOP_METHOD: GOLDMANN APPLANATION
OD_IOP_MMHG: 12
OS_IOP_MMHG: 12

## 2024-09-11 ASSESSMENT — EXTERNAL EXAM - LEFT EYE: OS_EXAM: NORMAL

## 2024-09-11 ASSESSMENT — ENCOUNTER SYMPTOMS: EYES NEGATIVE: 1

## 2024-09-11 ASSESSMENT — EXTERNAL EXAM - RIGHT EYE: OD_EXAM: NORMAL

## 2024-09-11 ASSESSMENT — SLIT LAMP EXAM - LIDS
COMMENTS: NORMAL
COMMENTS: NORMAL

## 2024-09-11 NOTE — PROGRESS NOTES
Assessment/Plan   Diagnoses and all orders for this visit:  Exudative age-related macular degeneration, left eye, with active choroidal neovascularization (Multi)  -     OCT, Retina - OU - Both Eyes      Wet AMD OS  H35.3221  - s/p FRANKLYN OS 08/09/23, 10/25/23, 12/27/23, 2/21/24, 5/1/24, 7/3/24, 9/11/24  - Discussed with pt that we will tighten interval for treat and extend protocol. Worsening SRF OS today, were trying treat and extend, will therefore tighten interval after today  -r/b of FRANKLYN discussed today, patient wished to proceed  -Consent valid until 12/31/24  - Eylea given OS today 9/11/24  - Continue amsler monitoring  - Follow up in 5-6 weeks    OCT Macula 09/11/24      OD: normal foveal depression, no evidence of retinal edema, focal + RPE disruption and pigment migration         OS: Abnormal foveal contour, +SRF    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    Please obtain pre-approval for Eylea HD for next visit

## 2024-09-24 ENCOUNTER — APPOINTMENT (OUTPATIENT)
Dept: PRIMARY CARE | Facility: CLINIC | Age: 89
End: 2024-09-24
Payer: COMMERCIAL

## 2024-10-11 ENCOUNTER — PHARMACY VISIT (OUTPATIENT)
Dept: PHARMACY | Facility: CLINIC | Age: 89
End: 2024-10-11
Payer: MEDICAID

## 2024-10-11 ENCOUNTER — LAB (OUTPATIENT)
Dept: LAB | Facility: LAB | Age: 89
End: 2024-10-11
Payer: COMMERCIAL

## 2024-10-11 ENCOUNTER — APPOINTMENT (OUTPATIENT)
Dept: PRIMARY CARE | Facility: CLINIC | Age: 89
End: 2024-10-11
Payer: COMMERCIAL

## 2024-10-11 VITALS
HEIGHT: 64 IN | HEART RATE: 100 BPM | RESPIRATION RATE: 16 BRPM | OXYGEN SATURATION: 98 % | DIASTOLIC BLOOD PRESSURE: 83 MMHG | TEMPERATURE: 98.2 F | SYSTOLIC BLOOD PRESSURE: 143 MMHG | WEIGHT: 126.3 LBS | BODY MASS INDEX: 21.56 KG/M2

## 2024-10-11 DIAGNOSIS — Z78.9 NEVER SMOKED TOBACCO: ICD-10-CM

## 2024-10-11 DIAGNOSIS — B18.2 CHRONIC HEPATITIS C WITHOUT HEPATIC COMA (MULTI): ICD-10-CM

## 2024-10-11 DIAGNOSIS — F41.9 ANXIETY: ICD-10-CM

## 2024-10-11 DIAGNOSIS — Z23 IMMUNIZATION DUE: Primary | ICD-10-CM

## 2024-10-11 DIAGNOSIS — L65.8 FEMALE PATTERN HAIR LOSS: ICD-10-CM

## 2024-10-11 DIAGNOSIS — I10 HYPERTENSION, UNSPECIFIED TYPE: ICD-10-CM

## 2024-10-11 PROBLEM — Z86.79 HISTORY OF HYPERTENSION: Status: ACTIVE | Noted: 2024-10-11

## 2024-10-11 PROBLEM — M19.90 ARTHRITIS: Status: ACTIVE | Noted: 2017-04-10

## 2024-10-11 PROBLEM — Z86.19 HISTORY OF INFECTIOUS DISEASE: Status: ACTIVE | Noted: 2023-04-05

## 2024-10-11 PROBLEM — R09.89 CAROTID BRUIT: Status: ACTIVE | Noted: 2024-10-11

## 2024-10-11 PROBLEM — H61.23 BILATERAL IMPACTED CERUMEN: Status: ACTIVE | Noted: 2024-01-13

## 2024-10-11 PROBLEM — K62.89 RECTAL PAIN: Status: ACTIVE | Noted: 2024-10-11

## 2024-10-11 PROBLEM — L29.9 ITCHING: Status: ACTIVE | Noted: 2024-10-11

## 2024-10-11 PROBLEM — I65.29 CAROTID ARTERY THROMBOSIS: Status: ACTIVE | Noted: 2017-10-20

## 2024-10-11 PROBLEM — K57.90 DIVERTICULAR DISEASE: Status: ACTIVE | Noted: 2024-10-11

## 2024-10-11 PROBLEM — R03.0 FINDING OF ABOVE NORMAL BLOOD PRESSURE: Status: ACTIVE | Noted: 2024-10-11

## 2024-10-11 PROBLEM — R92.8 ABNORMAL MAMMOGRAM: Status: ACTIVE | Noted: 2024-10-11

## 2024-10-11 PROBLEM — G93.0 ARACHNOID CYST: Status: ACTIVE | Noted: 2024-10-11

## 2024-10-11 PROBLEM — F41.0 PANIC DISORDER WITHOUT AGORAPHOBIA: Status: ACTIVE | Noted: 2017-04-10

## 2024-10-11 PROBLEM — I48.91 ATRIAL FIBRILLATION (MULTI): Status: ACTIVE | Noted: 2023-04-05

## 2024-10-11 PROBLEM — R42 DIZZINESS: Status: ACTIVE | Noted: 2023-04-05

## 2024-10-11 PROBLEM — R74.01 ELEVATION OF LEVELS OF LIVER TRANSAMINASE LEVELS: Status: ACTIVE | Noted: 2023-04-05

## 2024-10-11 PROBLEM — E44.0 MODERATE PROTEIN-CALORIE MALNUTRITION (MULTI): Status: ACTIVE | Noted: 2023-04-05

## 2024-10-11 PROBLEM — Z85.3 HISTORY OF MALIGNANT NEOPLASM OF BREAST: Status: ACTIVE | Noted: 2023-04-05

## 2024-10-11 PROBLEM — I25.2 OLD MYOCARDIAL INFARCTION: Status: ACTIVE | Noted: 2017-04-10

## 2024-10-11 PROBLEM — N63.0 MASS OF BREAST: Status: ACTIVE | Noted: 2024-10-11

## 2024-10-11 PROBLEM — H43.819 POSTERIOR VITREOUS DETACHMENT: Status: ACTIVE | Noted: 2024-10-11

## 2024-10-11 PROCEDURE — 1126F AMNT PAIN NOTED NONE PRSNT: CPT

## 2024-10-11 PROCEDURE — 90662 IIV NO PRSV INCREASED AG IM: CPT

## 2024-10-11 PROCEDURE — 3079F DIAST BP 80-89 MM HG: CPT

## 2024-10-11 PROCEDURE — 99213 OFFICE O/P EST LOW 20 MIN: CPT

## 2024-10-11 PROCEDURE — 90471 IMMUNIZATION ADMIN: CPT

## 2024-10-11 PROCEDURE — RXMED WILLOW AMBULATORY MEDICATION CHARGE

## 2024-10-11 PROCEDURE — 1160F RVW MEDS BY RX/DR IN RCRD: CPT

## 2024-10-11 PROCEDURE — 3077F SYST BP >= 140 MM HG: CPT

## 2024-10-11 PROCEDURE — 1036F TOBACCO NON-USER: CPT

## 2024-10-11 PROCEDURE — 1159F MED LIST DOCD IN RCRD: CPT

## 2024-10-11 RX ORDER — AMLODIPINE BESYLATE 5 MG/1
5 TABLET ORAL
Qty: 90 TABLET | Refills: 11 | Status: SHIPPED | OUTPATIENT
Start: 2024-10-11 | End: 2025-10-11

## 2024-10-11 RX ORDER — MINOXIDIL 2 %
SOLUTION, NON-ORAL TOPICAL 2 TIMES DAILY
Qty: 180 ML | Refills: 0 | Status: SHIPPED | OUTPATIENT
Start: 2024-10-11

## 2024-10-11 RX ORDER — HYDROXYZINE HYDROCHLORIDE 10 MG/1
5 TABLET, FILM COATED ORAL 3 TIMES DAILY
Qty: 30 TABLET | Refills: 0 | Status: SHIPPED | OUTPATIENT
Start: 2024-10-11 | End: 2024-11-10

## 2024-10-11 ASSESSMENT — ENCOUNTER SYMPTOMS
LOSS OF SENSATION IN FEET: 0
OCCASIONAL FEELINGS OF UNSTEADINESS: 0
DEPRESSION: 0

## 2024-10-11 ASSESSMENT — PATIENT HEALTH QUESTIONNAIRE - PHQ9
SUM OF ALL RESPONSES TO PHQ9 QUESTIONS 1 AND 2: 0
2. FEELING DOWN, DEPRESSED OR HOPELESS: NOT AT ALL
1. LITTLE INTEREST OR PLEASURE IN DOING THINGS: NOT AT ALL

## 2024-10-11 ASSESSMENT — PAIN SCALES - GENERAL: PAINLEVEL: 0-NO PAIN

## 2024-10-11 NOTE — PATIENT INSTRUCTIONS
Latvian black castor oil can be used for hair growth.     I prescribed you minoxidil shampoo for hair growth as well.    Take atarax 1/2 pill up to 3 x / day for feeling restless.     Continue taking amlodipine 5 mg and losartan 25 mg.

## 2024-10-14 ENCOUNTER — TELEPHONE (OUTPATIENT)
Dept: PRIMARY CARE | Facility: CLINIC | Age: 89
End: 2024-10-14
Payer: COMMERCIAL

## 2024-10-16 ENCOUNTER — HOSPITAL ENCOUNTER (INPATIENT)
Facility: HOSPITAL | Age: 89
LOS: 3 days | Discharge: HOME HEALTH | End: 2024-10-20
Attending: EMERGENCY MEDICINE | Admitting: INTERNAL MEDICINE
Payer: COMMERCIAL

## 2024-10-16 ENCOUNTER — APPOINTMENT (OUTPATIENT)
Dept: RADIOLOGY | Facility: HOSPITAL | Age: 89
End: 2024-10-16
Payer: COMMERCIAL

## 2024-10-16 ENCOUNTER — APPOINTMENT (OUTPATIENT)
Dept: OPHTHALMOLOGY | Facility: CLINIC | Age: 89
End: 2024-10-16
Payer: COMMERCIAL

## 2024-10-16 ENCOUNTER — PHARMACY VISIT (OUTPATIENT)
Dept: PHARMACY | Facility: CLINIC | Age: 89
End: 2024-10-16

## 2024-10-16 ENCOUNTER — CLINICAL SUPPORT (OUTPATIENT)
Dept: EMERGENCY MEDICINE | Facility: HOSPITAL | Age: 89
End: 2024-10-16
Payer: COMMERCIAL

## 2024-10-16 DIAGNOSIS — E86.0 DEHYDRATION: ICD-10-CM

## 2024-10-16 DIAGNOSIS — K56.600 PARTIAL SMALL BOWEL OBSTRUCTION (MULTI): ICD-10-CM

## 2024-10-16 DIAGNOSIS — K52.9 COLITIS: Primary | ICD-10-CM

## 2024-10-16 DIAGNOSIS — A08.4 VIRAL GASTROENTERITIS: ICD-10-CM

## 2024-10-16 LAB
ALBUMIN SERPL BCP-MCNC: 4 G/DL (ref 3.4–5)
ALP SERPL-CCNC: 73 U/L (ref 33–136)
ALT SERPL W P-5'-P-CCNC: 11 U/L (ref 7–45)
ANION GAP SERPL CALC-SCNC: 14 MMOL/L (ref 10–20)
AST SERPL W P-5'-P-CCNC: 31 U/L (ref 9–39)
BASOPHILS # BLD AUTO: 0.03 X10*3/UL (ref 0–0.1)
BASOPHILS NFR BLD AUTO: 0.4 %
BILIRUB SERPL-MCNC: 1.5 MG/DL (ref 0–1.2)
BUN SERPL-MCNC: 18 MG/DL (ref 6–23)
CALCIUM SERPL-MCNC: 9.7 MG/DL (ref 8.6–10.6)
CARDIAC TROPONIN I PNL SERPL HS: <3 NG/L (ref 0–34)
CHLORIDE SERPL-SCNC: 105 MMOL/L (ref 98–107)
CO2 SERPL-SCNC: 23 MMOL/L (ref 21–32)
CREAT SERPL-MCNC: 0.9 MG/DL (ref 0.5–1.05)
CRP SERPL-MCNC: 0.62 MG/DL
EGFRCR SERPLBLD CKD-EPI 2021: 61 ML/MIN/1.73M*2
EOSINOPHIL # BLD AUTO: 0.03 X10*3/UL (ref 0–0.4)
EOSINOPHIL NFR BLD AUTO: 0.4 %
ERYTHROCYTE [DISTWIDTH] IN BLOOD BY AUTOMATED COUNT: 13 % (ref 11.5–14.5)
ERYTHROCYTE [SEDIMENTATION RATE] IN BLOOD BY WESTERGREN METHOD: 34 MM/H (ref 0–30)
FLUAV RNA RESP QL NAA+PROBE: NOT DETECTED
FLUBV RNA RESP QL NAA+PROBE: NOT DETECTED
GLUCOSE SERPL-MCNC: 108 MG/DL (ref 74–99)
HCT VFR BLD AUTO: 42.7 % (ref 36–46)
HGB BLD-MCNC: 14 G/DL (ref 12–16)
IMM GRANULOCYTES # BLD AUTO: 0.02 X10*3/UL (ref 0–0.5)
IMM GRANULOCYTES NFR BLD AUTO: 0.3 % (ref 0–0.9)
LIPASE SERPL-CCNC: 38 U/L (ref 9–82)
LYMPHOCYTES # BLD AUTO: 0.67 X10*3/UL (ref 0.8–3)
LYMPHOCYTES NFR BLD AUTO: 8.6 %
MCH RBC QN AUTO: 31.7 PG (ref 26–34)
MCHC RBC AUTO-ENTMCNC: 32.8 G/DL (ref 32–36)
MCV RBC AUTO: 97 FL (ref 80–100)
MONOCYTES # BLD AUTO: 0.6 X10*3/UL (ref 0.05–0.8)
MONOCYTES NFR BLD AUTO: 7.7 %
NEUTROPHILS # BLD AUTO: 6.42 X10*3/UL (ref 1.6–5.5)
NEUTROPHILS NFR BLD AUTO: 82.6 %
NRBC BLD-RTO: 0 /100 WBCS (ref 0–0)
PLATELET # BLD AUTO: 204 X10*3/UL (ref 150–450)
POTASSIUM SERPL-SCNC: 5.1 MMOL/L (ref 3.5–5.3)
PROT SERPL-MCNC: 8 G/DL (ref 6.4–8.2)
RBC # BLD AUTO: 4.42 X10*6/UL (ref 4–5.2)
SARS-COV-2 RNA RESP QL NAA+PROBE: NOT DETECTED
SODIUM SERPL-SCNC: 137 MMOL/L (ref 136–145)
TSH SERPL-ACNC: 1.92 MIU/L (ref 0.44–3.98)
WBC # BLD AUTO: 7.8 X10*3/UL (ref 4.4–11.3)

## 2024-10-16 PROCEDURE — 71046 X-RAY EXAM CHEST 2 VIEWS: CPT

## 2024-10-16 PROCEDURE — 76705 ECHO EXAM OF ABDOMEN: CPT

## 2024-10-16 PROCEDURE — 84484 ASSAY OF TROPONIN QUANT: CPT | Performed by: EMERGENCY MEDICINE

## 2024-10-16 PROCEDURE — 93005 ELECTROCARDIOGRAM TRACING: CPT

## 2024-10-16 PROCEDURE — 2500000004 HC RX 250 GENERAL PHARMACY W/ HCPCS (ALT 636 FOR OP/ED): Mod: SE | Performed by: NURSE PRACTITIONER

## 2024-10-16 PROCEDURE — 87636 SARSCOV2 & INF A&B AMP PRB: CPT | Performed by: PHYSICIAN ASSISTANT

## 2024-10-16 PROCEDURE — 84443 ASSAY THYROID STIM HORMONE: CPT | Performed by: NURSE PRACTITIONER

## 2024-10-16 PROCEDURE — 2500000004 HC RX 250 GENERAL PHARMACY W/ HCPCS (ALT 636 FOR OP/ED): Mod: SE | Performed by: PHYSICIAN ASSISTANT

## 2024-10-16 PROCEDURE — 96372 THER/PROPH/DIAG INJ SC/IM: CPT | Performed by: NURSE PRACTITIONER

## 2024-10-16 PROCEDURE — 2550000001 HC RX 255 CONTRASTS: Mod: SE | Performed by: EMERGENCY MEDICINE

## 2024-10-16 PROCEDURE — 74177 CT ABD & PELVIS W/CONTRAST: CPT | Performed by: RADIOLOGY

## 2024-10-16 PROCEDURE — 76705 ECHO EXAM OF ABDOMEN: CPT | Performed by: SURGERY

## 2024-10-16 PROCEDURE — 80053 COMPREHEN METABOLIC PANEL: CPT | Performed by: PHYSICIAN ASSISTANT

## 2024-10-16 PROCEDURE — 99285 EMERGENCY DEPT VISIT HI MDM: CPT | Performed by: PHYSICIAN ASSISTANT

## 2024-10-16 PROCEDURE — G0378 HOSPITAL OBSERVATION PER HR: HCPCS

## 2024-10-16 PROCEDURE — 83690 ASSAY OF LIPASE: CPT | Performed by: PHYSICIAN ASSISTANT

## 2024-10-16 PROCEDURE — 96360 HYDRATION IV INFUSION INIT: CPT | Mod: 59

## 2024-10-16 PROCEDURE — 85025 COMPLETE CBC W/AUTO DIFF WBC: CPT | Performed by: EMERGENCY MEDICINE

## 2024-10-16 PROCEDURE — 74177 CT ABD & PELVIS W/CONTRAST: CPT

## 2024-10-16 PROCEDURE — 36415 COLL VENOUS BLD VENIPUNCTURE: CPT | Performed by: EMERGENCY MEDICINE

## 2024-10-16 PROCEDURE — 36415 COLL VENOUS BLD VENIPUNCTURE: CPT | Performed by: PHYSICIAN ASSISTANT

## 2024-10-16 PROCEDURE — 86140 C-REACTIVE PROTEIN: CPT | Performed by: NURSE PRACTITIONER

## 2024-10-16 PROCEDURE — 99285 EMERGENCY DEPT VISIT HI MDM: CPT | Mod: 25

## 2024-10-16 PROCEDURE — 71046 X-RAY EXAM CHEST 2 VIEWS: CPT | Performed by: STUDENT IN AN ORGANIZED HEALTH CARE EDUCATION/TRAINING PROGRAM

## 2024-10-16 PROCEDURE — 93010 ELECTROCARDIOGRAM REPORT: CPT | Performed by: EMERGENCY MEDICINE

## 2024-10-16 PROCEDURE — 85652 RBC SED RATE AUTOMATED: CPT | Performed by: NURSE PRACTITIONER

## 2024-10-16 PROCEDURE — 99222 1ST HOSP IP/OBS MODERATE 55: CPT | Performed by: NURSE PRACTITIONER

## 2024-10-16 RX ORDER — HYDROXYZINE HYDROCHLORIDE 10 MG/1
5 TABLET, FILM COATED ORAL 3 TIMES DAILY
Status: DISCONTINUED | OUTPATIENT
Start: 2024-10-16 | End: 2024-10-16

## 2024-10-16 RX ORDER — LOSARTAN POTASSIUM 25 MG/1
25 TABLET ORAL DAILY
Status: DISCONTINUED | OUTPATIENT
Start: 2024-10-17 | End: 2024-10-18

## 2024-10-16 RX ORDER — ACETAMINOPHEN 325 MG/1
650 TABLET ORAL EVERY 4 HOURS PRN
Status: DISCONTINUED | OUTPATIENT
Start: 2024-10-16 | End: 2024-10-20 | Stop reason: HOSPADM

## 2024-10-16 RX ORDER — LOSARTAN POTASSIUM 25 MG/1
25 TABLET ORAL DAILY
Status: DISCONTINUED | OUTPATIENT
Start: 2024-10-17 | End: 2024-10-16

## 2024-10-16 RX ORDER — POLYETHYLENE GLYCOL 3350 17 G/17G
17 POWDER, FOR SOLUTION ORAL DAILY PRN
Status: DISCONTINUED | OUTPATIENT
Start: 2024-10-16 | End: 2024-10-16

## 2024-10-16 RX ORDER — ACETAMINOPHEN 160 MG/5ML
650 SOLUTION ORAL EVERY 4 HOURS PRN
Status: DISCONTINUED | OUTPATIENT
Start: 2024-10-16 | End: 2024-10-20 | Stop reason: HOSPADM

## 2024-10-16 RX ORDER — HYDROXYZINE HYDROCHLORIDE 10 MG/1
5 TABLET, FILM COATED ORAL EVERY 8 HOURS PRN
Status: DISCONTINUED | OUTPATIENT
Start: 2024-10-16 | End: 2024-10-20 | Stop reason: HOSPADM

## 2024-10-16 RX ORDER — AMLODIPINE BESYLATE 5 MG/1
5 TABLET ORAL DAILY
Status: DISCONTINUED | OUTPATIENT
Start: 2024-10-17 | End: 2024-10-20 | Stop reason: HOSPADM

## 2024-10-16 RX ORDER — ENOXAPARIN SODIUM 100 MG/ML
40 INJECTION SUBCUTANEOUS EVERY 24 HOURS
Status: DISCONTINUED | OUTPATIENT
Start: 2024-10-16 | End: 2024-10-20 | Stop reason: HOSPADM

## 2024-10-16 RX ORDER — GUAIFENESIN 100 MG/5ML
200 SOLUTION ORAL EVERY 4 HOURS PRN
Status: DISCONTINUED | OUTPATIENT
Start: 2024-10-16 | End: 2024-10-20 | Stop reason: HOSPADM

## 2024-10-16 SDOH — SOCIAL STABILITY: SOCIAL INSECURITY: DO YOU FEEL ANYONE HAS EXPLOITED OR TAKEN ADVANTAGE OF YOU FINANCIALLY OR OF YOUR PERSONAL PROPERTY?: NO

## 2024-10-16 SDOH — SOCIAL STABILITY: SOCIAL INSECURITY: ARE THERE ANY APPARENT SIGNS OF INJURIES/BEHAVIORS THAT COULD BE RELATED TO ABUSE/NEGLECT?: NO

## 2024-10-16 SDOH — SOCIAL STABILITY: SOCIAL INSECURITY: HAS ANYONE EVER THREATENED TO HURT YOUR FAMILY OR YOUR PETS?: NO

## 2024-10-16 SDOH — SOCIAL STABILITY: SOCIAL INSECURITY: HAVE YOU HAD THOUGHTS OF HARMING ANYONE ELSE?: NO

## 2024-10-16 SDOH — SOCIAL STABILITY: SOCIAL INSECURITY: DO YOU FEEL UNSAFE GOING BACK TO THE PLACE WHERE YOU ARE LIVING?: NO

## 2024-10-16 SDOH — SOCIAL STABILITY: SOCIAL INSECURITY: WERE YOU ABLE TO COMPLETE ALL THE BEHAVIORAL HEALTH SCREENINGS?: YES

## 2024-10-16 SDOH — SOCIAL STABILITY: SOCIAL INSECURITY: HAVE YOU HAD ANY THOUGHTS OF HARMING ANYONE ELSE?: NO

## 2024-10-16 SDOH — SOCIAL STABILITY: SOCIAL INSECURITY: ABUSE: ADULT

## 2024-10-16 SDOH — SOCIAL STABILITY: SOCIAL INSECURITY: ARE YOU OR HAVE YOU BEEN THREATENED OR ABUSED PHYSICALLY, EMOTIONALLY, OR SEXUALLY BY ANYONE?: NO

## 2024-10-16 SDOH — SOCIAL STABILITY: SOCIAL INSECURITY: DOES ANYONE TRY TO KEEP YOU FROM HAVING/CONTACTING OTHER FRIENDS OR DOING THINGS OUTSIDE YOUR HOME?: NO

## 2024-10-16 ASSESSMENT — COGNITIVE AND FUNCTIONAL STATUS - GENERAL
PATIENT BASELINE BEDBOUND: NO
MOBILITY SCORE: 24
DAILY ACTIVITIY SCORE: 24

## 2024-10-16 ASSESSMENT — LIFESTYLE VARIABLES
EVER HAD A DRINK FIRST THING IN THE MORNING TO STEADY YOUR NERVES TO GET RID OF A HANGOVER: NO
HOW OFTEN DO YOU HAVE 6 OR MORE DRINKS ON ONE OCCASION: NEVER
TOTAL SCORE: 0
PRESCIPTION_ABUSE_PAST_12_MONTHS: NO
EVER FELT BAD OR GUILTY ABOUT YOUR DRINKING: NO
SKIP TO QUESTIONS 9-10: 1
SUBSTANCE_ABUSE_PAST_12_MONTHS: NO
HOW MANY STANDARD DRINKS CONTAINING ALCOHOL DO YOU HAVE ON A TYPICAL DAY: PATIENT DOES NOT DRINK
HAVE YOU EVER FELT YOU SHOULD CUT DOWN ON YOUR DRINKING: NO
AUDIT-C TOTAL SCORE: 0
HOW OFTEN DO YOU HAVE A DRINK CONTAINING ALCOHOL: NEVER
HAVE PEOPLE ANNOYED YOU BY CRITICIZING YOUR DRINKING: NO
AUDIT-C TOTAL SCORE: 0

## 2024-10-16 ASSESSMENT — ACTIVITIES OF DAILY LIVING (ADL)
PATIENT'S MEMORY ADEQUATE TO SAFELY COMPLETE DAILY ACTIVITIES?: YES
HEARING - RIGHT EAR: FUNCTIONAL
ADEQUATE_TO_COMPLETE_ADL: YES
FEEDING YOURSELF: INDEPENDENT
BATHING: INDEPENDENT
WALKS IN HOME: INDEPENDENT
JUDGMENT_ADEQUATE_SAFELY_COMPLETE_DAILY_ACTIVITIES: YES
HEARING - LEFT EAR: FUNCTIONAL
TOILETING: INDEPENDENT
DRESSING YOURSELF: INDEPENDENT
LACK_OF_TRANSPORTATION: NO
GROOMING: INDEPENDENT

## 2024-10-16 ASSESSMENT — ENCOUNTER SYMPTOMS
CONFUSION: 0
VOMITING: 0
ABDOMINAL PAIN: 1
COUGH: 1
DIARRHEA: 1
DIFFICULTY URINATING: 0
DIZZINESS: 0
SHORTNESS OF BREATH: 1
NAUSEA: 0
WEAKNESS: 1
FEVER: 0
CHILLS: 0

## 2024-10-16 ASSESSMENT — PATIENT HEALTH QUESTIONNAIRE - PHQ9
2. FEELING DOWN, DEPRESSED OR HOPELESS: NOT AT ALL
SUM OF ALL RESPONSES TO PHQ9 QUESTIONS 1 & 2: 0
1. LITTLE INTEREST OR PLEASURE IN DOING THINGS: NOT AT ALL

## 2024-10-16 ASSESSMENT — PAIN DESCRIPTION - LOCATION: LOCATION: ABDOMEN

## 2024-10-16 ASSESSMENT — PAIN - FUNCTIONAL ASSESSMENT: PAIN_FUNCTIONAL_ASSESSMENT: 0-10

## 2024-10-16 ASSESSMENT — COLUMBIA-SUICIDE SEVERITY RATING SCALE - C-SSRS
6. HAVE YOU EVER DONE ANYTHING, STARTED TO DO ANYTHING, OR PREPARED TO DO ANYTHING TO END YOUR LIFE?: NO
1. IN THE PAST MONTH, HAVE YOU WISHED YOU WERE DEAD OR WISHED YOU COULD GO TO SLEEP AND NOT WAKE UP?: NO
2. HAVE YOU ACTUALLY HAD ANY THOUGHTS OF KILLING YOURSELF?: NO

## 2024-10-16 ASSESSMENT — PAIN DESCRIPTION - DESCRIPTORS
DESCRIPTORS: TENDER
DESCRIPTORS: ACHING;TENDER

## 2024-10-16 ASSESSMENT — PAIN DESCRIPTION - PAIN TYPE: TYPE: ACUTE PAIN

## 2024-10-16 ASSESSMENT — PAIN SCALES - GENERAL
PAINLEVEL_OUTOF10: 4
PAINLEVEL_OUTOF10: 5 - MODERATE PAIN

## 2024-10-16 NOTE — ED TRIAGE NOTES
Pt presents to Ed for loose stool, lethargy, and shortness of breathe x 1 day. Pt states that she woke up around 0400 and had three episodes of loose stools, looked watery - not bloody, tarry, or black. Pt states that she became short of breathe when trying to get dressed and has been tired ever since. States she was just seen at her PCP the other day who reports her health has been improving. Pt received flu shot recently. Pt denies any sick contacts, change in diet, travel. Pt endorses a tenderness in epigastric region to touch, but denies chest pain, fever, chills, numbness or tingling or feeling faint. Pt is AOX3 and [place].

## 2024-10-17 ENCOUNTER — APPOINTMENT (OUTPATIENT)
Dept: RADIOLOGY | Facility: HOSPITAL | Age: 89
End: 2024-10-17
Payer: COMMERCIAL

## 2024-10-17 LAB
ALBUMIN SERPL BCP-MCNC: 3.3 G/DL (ref 3.4–5)
ANION GAP SERPL CALC-SCNC: 11 MMOL/L (ref 10–20)
ATRIAL RATE: 104 BPM
BASOPHILS # BLD AUTO: 0.03 X10*3/UL (ref 0–0.1)
BASOPHILS NFR BLD AUTO: 0.6 %
BUN SERPL-MCNC: 22 MG/DL (ref 6–23)
CALCIUM SERPL-MCNC: 9.1 MG/DL (ref 8.6–10.6)
CHLORIDE SERPL-SCNC: 108 MMOL/L (ref 98–107)
CO2 SERPL-SCNC: 23 MMOL/L (ref 21–32)
CREAT SERPL-MCNC: 0.79 MG/DL (ref 0.5–1.05)
EGFRCR SERPLBLD CKD-EPI 2021: 71 ML/MIN/1.73M*2
EOSINOPHIL # BLD AUTO: 0.12 X10*3/UL (ref 0–0.4)
EOSINOPHIL NFR BLD AUTO: 2.6 %
ERYTHROCYTE [DISTWIDTH] IN BLOOD BY AUTOMATED COUNT: 12.7 % (ref 11.5–14.5)
GLUCOSE SERPL-MCNC: 78 MG/DL (ref 74–99)
HCT VFR BLD AUTO: 36.3 % (ref 36–46)
HGB BLD-MCNC: 11.7 G/DL (ref 12–16)
IMM GRANULOCYTES # BLD AUTO: 0.01 X10*3/UL (ref 0–0.5)
IMM GRANULOCYTES NFR BLD AUTO: 0.2 % (ref 0–0.9)
LYMPHOCYTES # BLD AUTO: 1.74 X10*3/UL (ref 0.8–3)
LYMPHOCYTES NFR BLD AUTO: 37.7 %
MAGNESIUM SERPL-MCNC: 1.82 MG/DL (ref 1.6–2.4)
MCH RBC QN AUTO: 31.3 PG (ref 26–34)
MCHC RBC AUTO-ENTMCNC: 32.2 G/DL (ref 32–36)
MCV RBC AUTO: 97 FL (ref 80–100)
MONOCYTES # BLD AUTO: 0.64 X10*3/UL (ref 0.05–0.8)
MONOCYTES NFR BLD AUTO: 13.9 %
NEUTROPHILS # BLD AUTO: 2.08 X10*3/UL (ref 1.6–5.5)
NEUTROPHILS NFR BLD AUTO: 45 %
NRBC BLD-RTO: 0 /100 WBCS (ref 0–0)
P AXIS: -5 DEGREES
P OFFSET: 204 MS
P ONSET: 149 MS
PHOSPHATE SERPL-MCNC: 3 MG/DL (ref 2.5–4.9)
PLATELET # BLD AUTO: 205 X10*3/UL (ref 150–450)
POTASSIUM SERPL-SCNC: 3.4 MMOL/L (ref 3.5–5.3)
PR INTERVAL: 150 MS
Q ONSET: 224 MS
QRS COUNT: 17 BEATS
QRS DURATION: 68 MS
QT INTERVAL: 344 MS
QTC CALCULATION(BAZETT): 452 MS
QTC FREDERICIA: 413 MS
R AXIS: 8 DEGREES
RBC # BLD AUTO: 3.74 X10*6/UL (ref 4–5.2)
SODIUM SERPL-SCNC: 139 MMOL/L (ref 136–145)
T AXIS: -24 DEGREES
T OFFSET: 396 MS
VENTRICULAR RATE: 104 BPM
WBC # BLD AUTO: 4.6 X10*3/UL (ref 4.4–11.3)

## 2024-10-17 PROCEDURE — 2500000004 HC RX 250 GENERAL PHARMACY W/ HCPCS (ALT 636 FOR OP/ED): Performed by: INTERNAL MEDICINE

## 2024-10-17 PROCEDURE — 2500000004 HC RX 250 GENERAL PHARMACY W/ HCPCS (ALT 636 FOR OP/ED): Performed by: NURSE PRACTITIONER

## 2024-10-17 PROCEDURE — 74018 RADEX ABDOMEN 1 VIEW: CPT

## 2024-10-17 PROCEDURE — 1100000001 HC PRIVATE ROOM DAILY

## 2024-10-17 PROCEDURE — 2500000001 HC RX 250 WO HCPCS SELF ADMINISTERED DRUGS (ALT 637 FOR MEDICARE OP): Mod: SE | Performed by: INTERNAL MEDICINE

## 2024-10-17 PROCEDURE — 2500000001 HC RX 250 WO HCPCS SELF ADMINISTERED DRUGS (ALT 637 FOR MEDICARE OP): Mod: SE | Performed by: NURSE PRACTITIONER

## 2024-10-17 PROCEDURE — 2550000001 HC RX 255 CONTRASTS: Mod: SE | Performed by: INTERNAL MEDICINE

## 2024-10-17 PROCEDURE — 80069 RENAL FUNCTION PANEL: CPT | Performed by: NURSE PRACTITIONER

## 2024-10-17 PROCEDURE — 85025 COMPLETE CBC W/AUTO DIFF WBC: CPT | Performed by: NURSE PRACTITIONER

## 2024-10-17 PROCEDURE — 74018 RADEX ABDOMEN 1 VIEW: CPT | Performed by: RADIOLOGY

## 2024-10-17 PROCEDURE — 99232 SBSQ HOSP IP/OBS MODERATE 35: CPT | Performed by: INTERNAL MEDICINE

## 2024-10-17 PROCEDURE — 36415 COLL VENOUS BLD VENIPUNCTURE: CPT | Performed by: NURSE PRACTITIONER

## 2024-10-17 PROCEDURE — 83735 ASSAY OF MAGNESIUM: CPT | Performed by: NURSE PRACTITIONER

## 2024-10-17 PROCEDURE — 99254 IP/OBS CNSLTJ NEW/EST MOD 60: CPT | Performed by: STUDENT IN AN ORGANIZED HEALTH CARE EDUCATION/TRAINING PROGRAM

## 2024-10-17 RX ORDER — SIMETHICONE 80 MG
80 TABLET,CHEWABLE ORAL 4 TIMES DAILY
Status: DISCONTINUED | OUTPATIENT
Start: 2024-10-17 | End: 2024-10-20 | Stop reason: HOSPADM

## 2024-10-17 RX ORDER — DIATRIZOATE MEGLUMINE AND DIATRIZOATE SODIUM 660; 100 MG/ML; MG/ML
60 SOLUTION ORAL; RECTAL ONCE
Status: COMPLETED | OUTPATIENT
Start: 2024-10-17 | End: 2024-10-17

## 2024-10-17 RX ORDER — DEXTROSE MONOHYDRATE AND SODIUM CHLORIDE 5; .9 G/100ML; G/100ML
75 INJECTION, SOLUTION INTRAVENOUS CONTINUOUS
Status: DISCONTINUED | OUTPATIENT
Start: 2024-10-17 | End: 2024-10-18 | Stop reason: SDUPTHER

## 2024-10-17 ASSESSMENT — COGNITIVE AND FUNCTIONAL STATUS - GENERAL
MOBILITY SCORE: 24
MOBILITY SCORE: 24
DAILY ACTIVITIY SCORE: 24
DAILY ACTIVITIY SCORE: 24

## 2024-10-17 ASSESSMENT — PAIN SCALES - GENERAL
PAINLEVEL_OUTOF10: 0 - NO PAIN
PAINLEVEL_OUTOF10: 0 - NO PAIN

## 2024-10-17 ASSESSMENT — ACTIVITIES OF DAILY LIVING (ADL): LACK_OF_TRANSPORTATION: NO

## 2024-10-17 ASSESSMENT — PAIN - FUNCTIONAL ASSESSMENT: PAIN_FUNCTIONAL_ASSESSMENT: 0-10

## 2024-10-17 NOTE — PROGRESS NOTES
Emergency Medicine Transition of Care Note.    I received Jojo Luis in signout from previous ED provider.  Please see the previous ED provider note for all HPI, PE and MDM up to the time of signout at 1500. This is in addition to the primary record.    In brief Jojo Luis is an 89 y.o. female presenting for   Chief Complaint   Patient presents with    Shortness of Breath     At the time of signout we were awaiting: CT imaging and reevaluation for disposition.    Medical Decision Making  Patient is a 89-year-old female who presented for evaluation of diarrhea and mild abdominal pain and cramping.  She is tachycardic upon arrival but it was improved with fluids.  Patient able to tolerate some mild p.o.  Discussed with surgery who will evaluate at bedside for potential partial small bowel obstruction however patient is passing gas tolerating p.o. and having diarrhea so more likely concerning for colitis.  Patient admitted to medicine in the setting of mild dehydration given age and risk factors.  Patient is agreeable with plan for admission all questions answered.        Please see ED course for updates on patient status, results, and disposition.     ED Course as of 10/16/24 2030   Wed Oct 16, 2024   1942 CT abdomen pelvis w IV contrast  CT imaging reviewed with a partial obstruction secondary to potential strictures and colitis.  Patient is still passing gas and having diarrhea.   [SC]   1943 US right upper quadrant  Right upper quadrant ultrasound ordered in the setting of mildly elevated bilirubin, patient ultrasound without acute gallbladder pathology or dilated biliary system. [SC]   1943 Influenza A, and B PCR  Viral panel negative [SC]   1943 Lipase [SC]   1943 CBC with Differential(!)  Lipase within normal limits no leukocytosis anemia or thrombocytopenia [SC]   1943 Comprehensive metabolic panel(!)  No acute electrolyte renal or hepatic abnormalities, mildly elevated bilirubin [SC]      ED Course User  Index  [SC] Toya Colon DO         Diagnoses as of 10/16/24 2030   Colitis   Partial small bowel obstruction (Multi)   Dehydration           Final diagnoses:   [K52.9] Colitis   [K56.600] Partial small bowel obstruction (Multi)   [E86.0] Dehydration           Procedure  Procedures    Patient seen and discussed with Diana Zimmer MD;Da*    Toya Colon DO  PGY-3 Emergency Medicine

## 2024-10-17 NOTE — CONSULTS
[facility]  ACUTE CARE SURGERY - HISTORY AND PHYSICAL / CONSULT    Patient Name: Jojo Luis  MRN: [id number]  Admit Date: 1016  : 3/5/1934  AGE: 89 y.o.   GENDER: female  ==============================================================================  TODAY'S ASSESSMENT AND PLAN OF CARE:  89 y/o F w/ PSHx significant for colon cancer s/p right hemicolectomy, SBO s/p ex lap and ZOIE, ventral hernia repair who presents with Patient continues to have bowel function (passing gas, multiple bowel movements), denies nausea and vomiting, and has a benign abdominal exam. Labs obtained on admit unremarkable with WBC 7.8 with mild left shift. Vitals showed initial mild tachycardia, which quickly improved with fluid resuscitation. CT showed loops of mildly dilated small bowel and colonic wall thickening. ACS consulted for concerns of partial bowel obstruction.     Overall, patient's clinical history and presentation is most consistent with gastroenteritis vs inflammatory colitis. While patient may have a partial SBO from adhesions, there is no evidence of a closed loop obstruction or pneumoperitoneum to suggest bowel ischemia or perforation. Abdominal exam was soft, non-distended, non-tender to palpation. Patient is tolerating PO intake, passing flatus, and having bowel function.     Recommendations:  - no emergent surgical intervention indicated  - continue fluid resuscitation  - agree with medicine admission for colitis workup and management  - reach out with questions or concerns    Discussed with attending Dr. Kimmie Herrera MD  General Surgery Resident  PGY-2  ACS [id number]  ==============================================================================  CHIEF COMPLAINT/REASON FOR CONSULT:  Partial Small Bowel Obstruction    Jojo Luis is a 89 y.o. female with a PMH of A fib, HTN, HLD, MI, Hepatitis C, and breast cancer who presented to the ED with 1 day of diarrhea, fatigue, and  mild shortness of breath. ACS was consulted to evaluate for SBO in setting of abdominal pain and mildly dilated bowels seen on CT.     Patient stated that early this morning she had a cramping abdominal pain with loose bowel movements (pain improved on resolution of bowel movements). Patient denies other symptoms except for a mild nonproductive cough which started prior to the abdominal pain and diarrhea this AM. Per patient, abdominal pain improved after pt had two bowel movements. She denies any pain currently. Denies recent viral illness. She denies fevers, chills, nausea, emesis, chest pain, hematochezia, melena, dysuria.     PAST MEDICAL HISTORY:   PMH:   Past Medical History:   Diagnosis Date    Abnormal electrocardiogram (ECG) (EKG) 07/02/2013    Abnormal electrocardiogram    Biliuria 04/10/2017    Bilirubin in urine    Cerebral cysts 07/02/2013    Arachnoid cyst    Diverticulosis of intestine, part unspecified, without perforation or abscess without bleeding 07/02/2013    Diverticulosis    Impacted cerumen, bilateral 01/29/2018    Bilateral impacted cerumen    Low back pain, unspecified 04/10/2017    Low back pain    Non-ST elevation (NSTEMI) myocardial infarction (Multi) 04/10/2017    NSTEMI (non-ST elevated myocardial infarction)    Other specified diseases of anus and rectum 07/02/2013    Rectal pain    Pain in unspecified shoulder 04/10/2017    Chronic shoulder pain    Panic disorder (episodic paroxysmal anxiety) 07/02/2013    Panic disorder without agoraphobia    Personal history of other diseases of the digestive system 06/16/2021    History of small bowel obstruction    Personal history of other malignant neoplasm of large intestine     History of malignant neoplasm of colon    Personal history of other specified conditions 07/02/2013    History of insomnia    Tachycardia, unspecified 04/10/2017    Tachycardia    Unspecified osteoarthritis, unspecified site 04/10/2017    Arthritis       PSH: As  below  Past Surgical History:   Procedure Laterality Date    CHOLECYSTECTOMY  07/02/2015    Cholecystectomy    CT ANGIO NECK  9/13/2017    CT NECK ANGIO W AND WO IV CONTRAST 9/13/2017 [facility] ANCILLARY LEGACY    EXPLORATORY LAPAROTOMY  07/02/2015    Exploratory Laparotomy    HYSTERECTOMY  07/02/2013    Hysterectomy    MR HEAD ANGIO WO IV CONTRAST  8/12/2022    MR HEAD ANGIO WO IV CONTRAST 8/12/2022 [facility] EMERGENCY LEGACY    MR NECK ANGIO WO IV CONTRAST  8/12/2022    MR NECK ANGIO WO IV CONTRAST 8/12/2022 [facility] EMERGENCY LEGACY    OTHER SURGICAL HISTORY  06/03/2015    Right Hemicolectomy    VENTRAL HERNIA REPAIR  07/06/2015    Ventral Hernia Repair     FH: No pertinent family history  Family History   Problem Relation Name Age of Onset    Macular degeneration Mother       SOCIAL HISTORY:    Smoking:  None  Social History     Tobacco Use   Smoking Status Never    Passive exposure: Never   Smokeless Tobacco Never       Alcohol:    Social History     Substance and Sexual Activity   Alcohol Use Never       Drug use: None    MEDICATIONS:   Prior to Admission medications    Medication Sig Start Date End Date Taking? Authorizing Provider   acetaminophen-codeine (Tylenol w/ Codeine #3) 300-30 mg tablet Take by mouth twice a day.  1 tab(s) orally 2 times a day as needed for severe pain10 4/13/18   Historical Provider, MD   amLODIPine (Norvasc) 5 mg tablet TAKE 1 TABLET (5 MG) BY MOUTH ONCE DAILY. 10/11/24 10/11/25  Nakia Saha MD   carbamide peroxide (Debrox) 6.5 % otic solution Administer 5 drops into affected ear(s) twice a day. 9/12/22   Historical Provider, MD   hydrOXYzine HCL (Atarax) 10 mg tablet Take 0.5 tablets (5 mg) by mouth 3 times a day. 10/11/24 11/10/24  Nakia Saha MD   losartan (Cozaar) 25 mg tablet Take 1 tablet (25 mg) by mouth once daily. 2/8/24 2/7/25  Nakia Saha MD   minoxidil (Rogaine) 2 % external solution Apply topically 2 times a day. 10/11/24   Nakia Saha MD   amLODIPine (Norvasc) 5 mg  tablet TAKE 1 TABLET (5 MG) BY MOUTH ONCE DAILY. 9/18/23 10/11/24  Nakia Saha MD   LORazepam (Ativan) 0.5 mg tablet Take 1 tablet (0.5 mg) by mouth every 12 hours.  10/11/24  Historical Provider, MD     ALLERGIES: None  No Known Allergies    REVIEW OF SYSTEMS:  Denies nausea, vomiting, shortness of breath, vision changes, headaches, chest pain, abdominal pain, dysuria, constipation, numbness and tingling in the extremities. Endorses nonbloody diarrhea.    PHYSICAL EXAM:  Physical Exam  HENT:      Head: Normocephalic and atraumatic.      Mouth/Throat:      Mouth: Mucous membranes are moist.   Eyes:      Extraocular Movements: Extraocular movements intact.   Cardiovascular:      Rate and Rhythm: Normal rate.   Pulmonary:      Effort: Pulmonary effort is normal. No respiratory distress.   Chest:      Chest wall: No tenderness.   Abdominal:      General: Abdomen is flat. There is no distension.      Palpations: Abdomen is soft.      Tenderness: There is no abdominal tenderness. There is no guarding.      Comments: Lower midline scar   Musculoskeletal:      Cervical back: Normal range of motion.   Skin:     General: Skin is warm and dry.      Capillary Refill: Capillary refill takes less than 2 seconds.   Neurological:      General: No focal deficit present.      Mental Status: She is alert.   Psychiatric:         Mood and Affect: Mood normal.         Behavior: Behavior normal.         Thought Content: Thought content normal.         Judgment: Judgment normal.     IMAGING SUMMARY:  (summary of findings, not a copy of dictation)  No pertinent imaging    LABS:  Results from last 7 days   Lab Units 10/16/24  1123   WBC AUTO x10*3/uL 7.8   HEMOGLOBIN g/dL 14.0   HEMATOCRIT % 42.7   PLATELETS AUTO x10*3/uL 204   NEUTROS PCT AUTO % 82.6   LYMPHS PCT AUTO % 8.6   MONOS PCT AUTO % 7.7   EOS PCT AUTO % 0.4         Results from last 7 days   Lab Units 10/16/24  1332   SODIUM mmol/L 137   POTASSIUM mmol/L 5.1   CHLORIDE mmol/L  105   CO2 mmol/L 23   BUN mg/dL 18   CREATININE mg/dL 0.90   CALCIUM mg/dL 9.7   PROTEIN TOTAL g/dL 8.0   BILIRUBIN TOTAL mg/dL 1.5*   ALK PHOS U/L 73   ALT U/L 11   AST U/L 31   GLUCOSE mg/dL 108*     Results from last 7 days   Lab Units 10/16/24  1332   BILIRUBIN TOTAL mg/dL 1.5*             I have reviewed all laboratory and imaging results ordered/pertinent for this encounter.

## 2024-10-17 NOTE — PROGRESS NOTES
"Subjective   Patient ID: Jojo Luis is a 89 y.o. female who presents for Follow-up, Flu Vaccine, and Med Refill.    HPI     # concern for hair loss   - pt states that she has noticed that she has had a receding hairline.   - she tends to put her hair in a tight knot and she is not sure if that is what is causing the hair loss.   - she notices that when she townsend her hair she will notice hair strands and more so than usual.     # anxiety   - states that she gets bouts of anxiety.   - when she feels this way she feels restless.     Review of Systems  12 point ROS negative except as stated in HPI.     Objective   /83 (BP Location: Right arm, Patient Position: Sitting, BP Cuff Size: Adult)   Pulse 100   Temp 36.8 °C (98.2 °F) (Temporal)   Resp 16   Ht 1.626 m (5' 4\")   Wt 57.3 kg (126 lb 4.8 oz)   SpO2 98%   BMI 21.68 kg/m²     Physical Exam  Gen: NAD, nontox  HEENT: NCAT. MMM. Hair follicle roots at frontal scalp, receding hairline noted  RESP: no resp distress, talks in full sentences, CTABL  CVS: non-tachycardic, RRR  ABD: Soft, non-distended  Psych: appropriately answers questions. normal mood and affect  MSK: appears to have Full range of motion on all extremities.    Assessment/Plan   SHELIA Luis is a 89 yo w/ hx of HTN who presents to the clinic for follow up. She has no acute concerns today but does state that her hairline is receding. She also complains of bouts of anxiety. She would like something to help with this anxiety. Denies any life stressors.     Problem List Items Addressed This Visit       Chronic hepatitis C (Multi)    Relevant Orders    Hepatitis C RNA, Quantitative, PCR    HTN (hypertension)    Relevant Medications    amLODIPine (Norvasc) 5 mg tablet     Other Visit Diagnoses       Immunization due    -  Primary    Relevant Orders    Flu vaccine, trivalent, preservative free, HIGH-DOSE, age 65y+ (Fluzone) (Completed)    BMI 21.0-21.9, adult        Never smoked tobacco        Anxiety     "    Relevant Medications    hydrOXYzine HCL (Atarax) 10 mg tablet    Female pattern hair loss        Relevant Medications    minoxidil (Rogaine) 2 % external solution        RTC in 3 months to follow up on anxiety     Discussed with Dr. Teddy Saha MD, MPH   Family Medicine and Preventive Health, PGY-3

## 2024-10-17 NOTE — PROGRESS NOTES
10/17/24 1201   Discharge Planning   Living Arrangements Alone   Assistance Needed None   Type of Residence Private residence   Do you have animals or pets at home? No   Home or Post Acute Services None   Expected Discharge Disposition Home   Does the patient need discharge transport arranged? No   Financial Resource Strain   How hard is it for you to pay for the very basics like food, housing, medical care, and heating? Not very   Housing Stability   In the last 12 months, was there a time when you were not able to pay the mortgage or rent on time? N   At any time in the past 12 months, were you homeless or living in a shelter (including now)? N   Transportation Needs   In the past 12 months, has lack of transportation kept you from medical appointments or from getting medications? no   In the past 12 months, has lack of transportation kept you from meetings, work, or from getting things needed for daily living? No       Transitional Care Coordination Progress Note:    Patient discussed during interdisciplinary rounds.    Team members present: Sharon Gray MD    Plan per Medical/Surgical team:  Stool C. Diff  ordered only if stool continues to be watery. Encourage PO and manage pain. ADOD 1 to 2 days.    Payor: [employer]    Discharge disposition: Home    Potential Barriers: None    ADOD: 10/18/2024         Previous Home Care: None    DME: None    Pharmacy:  [facility]    Falls: None    PCP:  Domo Saha MD, Last seen 10/11/2024  Met with patient and identified self and role. Patient lives alone in a private residence. Patient states that Grandson is support person. Patient states she is independent with ADL's. Patent states that her friends and grandson transport her to doctor appointments. Expected disposition is home. Patient states her son will be able to give her ride home after discharge. Patient states she is able to make her own appoint with PCP after Discharge. Patient states she has not other  Financial/Social needs at this time. This TCC will continue monitor and update when needed.         [email or web address], RN    Transitional Care Coordinator    [place] [id number]    O: [phone]    [email or web address]

## 2024-10-17 NOTE — CARE PLAN
The patient's goals for the shift include Patient will remain safe and free from injury.    The clinical goals for the shift include Pt to have decreased abd bloating and cramping this shift      Problem: Pain - Adult  Goal: Verbalizes/displays adequate comfort level or baseline comfort level  Outcome: Progressing     Problem: Safety - Adult  Goal: Free from fall injury  Outcome: Progressing     Problem: Chronic Conditions and Co-morbidities  Goal: Patient's chronic conditions and co-morbidity symptoms are monitored and maintained or improved  Outcome: Progressing

## 2024-10-17 NOTE — H&P
History Of Present Illness  Jojo Luis is a 89 y.o. female with a PMH of HTN, HLD, breast cancer, MI, arthritis, DHARMESH, afib, and Hep C. Pt presented to ED for further evaluation of cramping abdominal pain, diarrhea, cough and SOB that started 7 hrs prior to ED presentation. Pt reports that cough is nonproductive. Abdominal pain improved after pt has two bowel movements. Labs obtained on admit unremarkable. Imaging obtained while in the ED showed findings suggestive of  small bowel obstruction. ACS was consulted and pt seen by consulting team while in ED. Per ACS: patient continues to have bowel function (passing gas, multiple bowel movements), denies nausea and vomiting, and has a benign abdominal exam. Per ED provider, consulting team will continue to follow patient. Pt resting when seen by writer in the ED. Stool pathogen PCR and C-diff PCR pending collection. Pt denies laxative use outpatient. Pt admitted to medicine service for treatment of colitis.     Past Medical History  Past Medical History:   Diagnosis Date    Abnormal electrocardiogram (ECG) (EKG) 07/02/2013    Abnormal electrocardiogram    Biliuria 04/10/2017    Bilirubin in urine    Cerebral cysts 07/02/2013    Arachnoid cyst    Diverticulosis of intestine, part unspecified, without perforation or abscess without bleeding 07/02/2013    Diverticulosis    Impacted cerumen, bilateral 01/29/2018    Bilateral impacted cerumen    Low back pain, unspecified 04/10/2017    Low back pain    Non-ST elevation (NSTEMI) myocardial infarction (Multi) 04/10/2017    NSTEMI (non-ST elevated myocardial infarction)    Other specified diseases of anus and rectum 07/02/2013    Rectal pain    Pain in unspecified shoulder 04/10/2017    Chronic shoulder pain    Panic disorder (episodic paroxysmal anxiety) 07/02/2013    Panic disorder without agoraphobia    Personal history of other diseases of the digestive system 06/16/2021    History of small bowel obstruction    Personal  history of other malignant neoplasm of large intestine     History of malignant neoplasm of colon    Personal history of other specified conditions 07/02/2013    History of insomnia    Tachycardia, unspecified 04/10/2017    Tachycardia    Unspecified osteoarthritis, unspecified site 04/10/2017    Arthritis       Surgical History  Past Surgical History:   Procedure Laterality Date    CHOLECYSTECTOMY  07/02/2015    Cholecystectomy    CT ANGIO NECK  9/13/2017    CT NECK ANGIO W AND WO IV CONTRAST [place] [facility] [place] [place]    EXPLORATORY LAPAROTOMY  07/02/2015    Exploratory Laparotomy    HYSTERECTOMY  07/02/2013    Hysterectomy    MR HEAD ANGIO WO IV CONTRAST  8/12/2022    MR HEAD ANGIO WO IV CONTRAST [place] [facility] [place] [facility] [place]    MR NECK ANGIO WO IV CONTRAST  8/12/2022    MR NECK ANGIO WO IV CONTRAST 8[place]/2022 [facility] [place] [facility] [place]    OTHER SURGICAL HISTORY  06/03/2015    Right Hemicolectomy    VENTRAL HERNIA REPAIR  07/06/2015    Ventral Hernia Repair        Social History  She reports that she has never smoked. She has never been exposed to tobacco smoke. She has never used smokeless tobacco. She reports that she does not drink alcohol and does not use drugs.    Family History  Family History   Problem Relation Name Age of Onset    Macular degeneration Mother          Allergies  Patient has no known allergies.    Review of Systems   Constitutional:  Negative for chills and fever.   HENT:  Negative for congestion.    Respiratory:  Positive for cough and shortness of breath.    Gastrointestinal:  Positive for abdominal pain and diarrhea. Negative for nausea and vomiting.   Genitourinary:  Negative for difficulty urinating.   Skin: Negative.    Neurological:  Positive for weakness. Negative for dizziness.   Psychiatric/Behavioral:  Negative for confusion.         Physical Exam  Vitals reviewed.   Constitutional:       General: She is not in acute distress.  HENT:      Head: Normocephalic.      Nose: Nose  "normal.      Mouth/Throat:      Mouth: Mucous membranes are dry.   Eyes:      Extraocular Movements: Extraocular movements intact.   Cardiovascular:      Rate and Rhythm: Normal rate.      Pulses: Normal pulses.      Heart sounds: Normal heart sounds.   Pulmonary:      Effort: Pulmonary effort is normal. No respiratory distress.      Breath sounds: Normal breath sounds.   Abdominal:      General: Bowel sounds are normal.      Palpations: Abdomen is soft.   Musculoskeletal:         General: Normal range of motion.      Cervical back: Normal range of motion.   Skin:     General: Skin is warm and dry.   Neurological:      Mental Status: She is alert.      Comments: Disoriented to time      Last Recorded Vitals  Blood pressure 115/77, pulse 86, temperature 36.8 °C (98.3 °F), temperature source Oral, resp. rate 16, height 1.626 m (5' 4\"), weight 57.6 kg (127 lb), SpO2 98%.    Relevant Results  US right upper quadrant    Result Date: 10/16/2024  Interpreted By:  Nestor Moya and Dervishi Mario STUDY: US RIGHT UPPER QUADRANT;  10/16/2024 6:25 pm   INDICATION: Signs/Symptoms:elevated bili, diarrhea.   COMPARISON: CT abdomen pelvis: 10/16/2024   ACCESSION NUMBER(S): [id number]   ORDERING CLINICIAN: YOLANDA STREETER   TECHNIQUE: Multiple images of the right upper quadrant were obtained.   FINDINGS: LIVER: The liver measures 16.4 cm and is grossly unremarkable and free of any focal lesions.     GALLBLADDER: The gallbladder is surgically absent.     BILE DUCTS: No evidence of intra or extrahepatic biliary dilatation is identified; the common bile duct measures 0.6 cm.   PANCREAS: The visualized pancreas is unremarkable in appearance.   RIGHT KIDNEY: The right kidney measures 9.9 cm in length. The renal cortical echogenicity and thickness are within normal limit.  No hydronephrosis or renal calculi are seen.       Cholecystectomy.   Otherwise, normal ultrasound of the right upper quadrant.   I personally reviewed the " images/study and I agree with the findings as stated by Resident Declan Sofia MD.   MACRO: None   Signed by: Nestor Griffin 10/16/2024 7:45 PM Dictation workstation:   [id number]    CT abdomen pelvis w IV contrast    Result Date: 10/16/2024  Interpreted By:  Patricio Flores, STUDY: CT ABDOMEN PELVIS W IV CONTRAST;  10/16/2024 3:14 pm   INDICATION: Signs/Symptoms:abd pain; diarrhea/SOB.     COMPARISON: None.   ACCESSION NUMBER(S): [id number]   ORDERING CLINICIAN: SMITHA SAMUELS   TECHNIQUE: CT of the abdomen and pelvis was performed.  Standard contiguous axial images were obtained at 3 mm slice thickness through the abdomen and pelvis. Coronal and sagittal reconstructions at 3 mm slice thickness were performed.   75 ml of contrast Omnipaque 350 were administered intravenously without immediate complication.   FINDINGS: LOWER CHEST: The visualized lung base is unremarkable. The heart is normal in size without pericardial effusion. No pleural effusion is present. Visualized distal esophagus appears normal. Persistent elevation of left hemidiaphragm.   ABDOMEN:   LIVER: The liver demonstrates homogeneous attenuation without evidence of focal liver lesions.   BILE DUCTS: The intrahepatic and extrahepatic ducts are not dilated.   GALLBLADDER: Gallbladder surgically absent.   PANCREAS: The main pancreatic duct appears prominent. There is a small hypodense lesion that is too small to be characterized and current examination in the body of the pancreas (Series 201, Image 75). Finding is stable since at least 2015.   SPLEEN: There is a hyperattenuating lesion in the spleen which measures 1.0 x 1.1 cm (Series 201, Image 15), likely representing a hemangioma. The spleen is normal size. No other focal lesions seen. Incidental note is made of a splenule adjacent to the spleen (Series 201, Image 24).   ADRENAL GLANDS: Bilateral adrenal glands appear normal.   KIDNEYS AND URETERS: Small hypodense lesions in the bilateral  kidneys are favored to represent simple cysts. No suspicious lesions seen. The kidneys otherwise enhance symmetrically. No evidence of hydroureteronephrosis or nephroureterolithiasis.   PELVIS:   BLADDER: Within normal limits.   REPRODUCTIVE ORGANS: The uterus is surgically absent.   BOWEL: Stomach is under distended and limited for evaluation. Within this limitation, the stomach appears unremarkable.   There are multiple segments of inflamed small bowel with wall thickening interspersed with intervening segments of mildly dilated loops of bowel. Findings suggest inflammatory strictures causing partial obstruction (Series 201, Image 105) and (Series 201, Image 109). Dilated loops measure up to 3 cm in maximum diameter. No pneumatosis, abscess formation, or fistula seen. Short-term follow-up imaging is recommended.   Patient is status post hemicolectomy with ileo colonic anastomosis seen in the right pelvis. Remaining portions of the colon are decompressed.   VESSELS: There is no aneurysmal dilatation of the abdominal aorta. The IVC appears normal. Moderate atherosclerotic disease burden of the abdominal aorta and its major branches.   PERITONEUM/RETROPERITONEUM/LYMPH NODES: No ascites or free air, no fluid collection.  No abdominopelvic lymphadenopathy is present.   BONES AND ABDOMINAL WALL: No suspicious osseous lesions are identified. Degenerative discogenic disease is noted in the lower thoracic and lumbar spine.  The abdominal wall soft tissues appear normal.       1.  There are multiple segments of inflamed small bowel with wall thickening interspersed with intervening segments of mildly dilated loops of bowel. Findings suggest inflammatory strictures causing partial obstruction (Series 201, Image 105) and (Series 201, Image 109). Dilated loops measure up to 3 cm in maximum diameter. No pneumatosis, abscess formation, or fistula seen. Short-term follow-up imaging is recommended.   I personally reviewed the  images/study and I agree with the findings as stated by Patricio Flores MD (resident). This study was interpreted at [place], [place].   MACRO: None     Dictation workstation:   [id number]    XR chest 2 views    Result Date: 10/16/2024  Interpreted By:  Joshua Joy and Gupta Jayesh STUDY: XR CHEST 2 VIEWS;  10/16/2024 1:44 pm   INDICATION: Signs/Symptoms:diarrhea, SOB.   COMPARISON: Comparison radiograph 09/12/2022.   ACCESSION NUMBER(S): [id number]   ORDERING CLINICIAN: SMITHA SAMUELS   TECHNIQUE: PA and lateral radiographs of the chest were provided.   FINDINGS: CARDIOMEDIASTINAL SILHOUETTE: Cardiomediastinal silhouette is appropriate in size and configuration.   LUNGS: Similar persistent elevation of the left hemidiaphragm. Otherwise, no consolidation, edema, effusion, or pneumothorax.   ABDOMEN: No upper abdominal findings.   BONES: No osseous changes.       Persistent elevation of left hemidiaphragm. No other acute cardiopulmonary process.   I personally reviewed the images/study and I agree with the findings as stated by Jamaal Yoo MD. This study was interpreted at [place], [place].   MACRO: None   Signed by: Joshua Joy 10/16/2024 2:00 PM Dictation workstation:   [id number]      Results for orders placed or performed during the hospital encounter of 10/16/24 (from the past 24 hours)   CBC with Differential   Result Value Ref Range    WBC 7.8 4.4 - 11.3 x10*3/uL    nRBC 0.0 0.0 - 0.0 /100 WBCs    RBC 4.42 4.00 - 5.20 x10*6/uL    Hemoglobin 14.0 12.0 - 16.0 g/dL    Hematocrit 42.7 36.0 - 46.0 %    MCV 97 80 - 100 fL    MCH 31.7 26.0 - 34.0 pg    MCHC 32.8 32.0 - 36.0 g/dL    RDW 13.0 11.5 - 14.5 %    Platelets 204 150 - 450 x10*3/uL    Neutrophils % 82.6 40.0 - 80.0 %    Immature Granulocytes %, Automated 0.3 0.0 - 0.9 %    Lymphocytes % 8.6 13.0 - 44.0 %    Monocytes % 7.7 2.0 - 10.0 %    Eosinophils %  0.4 0.0 - 6.0 %    Basophils % 0.4 0.0 - 2.0 %    Neutrophils Absolute 6.42 (H) 1.60 - 5.50 x10*3/uL    Immature Granulocytes Absolute, Automated 0.02 0.00 - 0.50 x10*3/uL    Lymphocytes Absolute 0.67 (L) 0.80 - 3.00 x10*3/uL    Monocytes Absolute 0.60 0.05 - 0.80 x10*3/uL    Eosinophils Absolute 0.03 0.00 - 0.40 x10*3/uL    Basophils Absolute 0.03 0.00 - 0.10 x10*3/uL   Troponin I, High Sensitivity   Result Value Ref Range    Troponin I, High Sensitivity (CMC) <3 0 - 34 ng/L   Comprehensive metabolic panel   Result Value Ref Range    Glucose 108 (H) 74 - 99 mg/dL    Sodium 137 136 - 145 mmol/L    Potassium 5.1 3.5 - 5.3 mmol/L    Chloride 105 98 - 107 mmol/L    Bicarbonate 23 21 - 32 mmol/L    Anion Gap 14 10 - 20 mmol/L    Urea Nitrogen 18 6 - 23 mg/dL    Creatinine 0.90 0.50 - 1.05 mg/dL    eGFR 61 >60 mL/min/1.73m*2    Calcium 9.7 8.6 - 10.6 mg/dL    Albumin 4.0 3.4 - 5.0 g/dL    Alkaline Phosphatase 73 33 - 136 U/L    Total Protein 8.0 6.4 - 8.2 g/dL    AST 31 9 - 39 U/L    Bilirubin, Total 1.5 (H) 0.0 - 1.2 mg/dL    ALT 11 7 - 45 U/L   Lipase   Result Value Ref Range    Lipase 38 9 - 82 U/L   Sars-CoV-2 PCR   Result Value Ref Range    Coronavirus 2019, PCR Not Detected Not Detected   Influenza A, and B PCR   Result Value Ref Range    Flu A Result Not Detected Not Detected    Flu B Result Not Detected Not Detected      ED Medication Administration from 10/16/2024 1025 to 10/16/2024 2128         Date/Time Order Dose Route Action Action by     10/16/2024 1415 EDT sodium chloride 0.9 % bolus 500 mL 500 mL intravenous New Bag KOKO Santos     10/16/2024 1503 EDT iohexol (OMNIPaque) 350 mg iodine/mL solution 75 mL 75 mL intravenous Given KATRINA Fall     10/16/2024 1537 EDT sodium chloride 0.9 % bolus 500 mL 0 mL intravenous Stopped KOKO Santos            Scheduled medications  [START ON 10/17/2024] amLODIPine, 5 mg, oral, Daily  enoxaparin, 40 mg, subcutaneous, q24h  [START ON 10/17/2024] losartan, 25 mg, oral,  Daily      Continuous medications     PRN medications  PRN medications: acetaminophen **OR** acetaminophen, guaiFENesin, hydrOXYzine HCL    Assessment/Plan   #colitis  #abdominal pain  #diarrhea  - CT A/P: There are multiple segments of inflamed small bowel with wall thickening interspersed with intervening segments of mildly dilated loops of bowel. Findings suggest inflammatory strictures causing partial obstruction.  - ACS consulted, appreciate recs  - pt actively passing flatus, has bowel sounds, denies n/v  - KUB ordered for AM  - stool pathogen PCR and C-diff PCR pending collection  - contact plus precautions for c-diff r/o  - apply hot packs to abdomen to help with abdominal discomfort  - encourage PO fluid intake    #cough  #SOB  - Pox 97-99% on RA  - CXR:Persistent elevation of left hemidiaphragm. No other acute cardiopulmonary process.Unchanged from prior CXR obtained 9/12/22.  - pt denied SOB when seen by writer  - Robitussin q4h PRN cough    #HTN  - last /77  - continue to monitor BP  - continue home dose of Norvasc 5 mg PO every day and Losartan 25 mg PO every day (hold for SBP<110)    #anxiety  - continue Atarax 5 mg PO TID PRN anxiety    Dispo: admit to RNF. Plan per above.      I spent 55 minutes in the professional and overall care of this patient.      Josie Phillips, APRN-CNP

## 2024-10-17 NOTE — PROGRESS NOTES
"Jojo Luis is a 89 y.o. female on day 0 of admission presenting with Colitis.    Subjective   Lying in bed. No distress.  Patient reports no nausea vomiting.  Since she has been on the floor there has been no bowel movements.  Still passing gas.  She has pain in the area above the suprapubic and below the umbilicus over an old scar.  Pain is described as 4/10 and crampy pain as if she needs to go to the bathroom.    Objective     General: Lying in bed without distress.  Cooperative.  Skin: No rashes or ulcerations.  Old surgical scar noted on abdomen over umbilicus going down towards suprapubic area.  HEENT: Sclera is white.  Mucous membranes moist.  Neck: Supple.  No JVD.  Cardiac: Regular rate and rhythm, S1/S2 normal.  Lungs: Clear to auscultation bilaterally, no wheezing or crackles, no accessory muscle use at rest.  Abdomen: Soft, minimal tenderness with palpation to the area above the suprapubic, mildly obese, BS +  Extremities: No cyanosis.  No lower extremity edema.  Neurologic: Alert and oriented x3.  No focal deficits.  Psychiatric: Appropriate mood and behavior.  Currently no agitation.    Last Recorded Vitals  Blood pressure 111/72, pulse 78, temperature 36.5 °C (97.7 °F), resp. rate 16, height 1.626 m (5' 4\"), weight 56.9 kg (125 lb 8 oz), SpO2 98%.  On room air.    Intake/Output last 3 Shifts:  No intake/output data recorded.    Relevant Results  amLODIPine, 5 mg, oral, Daily  enoxaparin, 40 mg, subcutaneous, q24h  losartan, 25 mg, oral, Daily           PRN medications: acetaminophen **OR** acetaminophen, guaiFENesin, hydrOXYzine HCL     Results for orders placed or performed during the hospital encounter of 10/16/24 (from the past 24 hours)   CBC with Differential   Result Value Ref Range    WBC 7.8 4.4 - 11.3 x10*3/uL    nRBC 0.0 0.0 - 0.0 /100 WBCs    RBC 4.42 4.00 - 5.20 x10*6/uL    Hemoglobin 14.0 12.0 - 16.0 g/dL    Hematocrit 42.7 36.0 - 46.0 %    MCV 97 80 - 100 fL    MCH 31.7 26.0 - 34.0 pg    " MCHC 32.8 32.0 - 36.0 g/dL    RDW 13.0 11.5 - 14.5 %    Platelets 204 150 - 450 x10*3/uL    Neutrophils % 82.6 40.0 - 80.0 %    Immature Granulocytes %, Automated 0.3 0.0 - 0.9 %    Lymphocytes % 8.6 13.0 - 44.0 %    Monocytes % 7.7 2.0 - 10.0 %    Eosinophils % 0.4 0.0 - 6.0 %    Basophils % 0.4 0.0 - 2.0 %    Neutrophils Absolute 6.42 (H) 1.60 - 5.50 x10*3/uL    Immature Granulocytes Absolute, Automated 0.02 0.00 - 0.50 x10*3/uL    Lymphocytes Absolute 0.67 (L) 0.80 - 3.00 x10*3/uL    Monocytes Absolute 0.60 0.05 - 0.80 x10*3/uL    Eosinophils Absolute 0.03 0.00 - 0.40 x10*3/uL    Basophils Absolute 0.03 0.00 - 0.10 x10*3/uL   Troponin I, High Sensitivity   Result Value Ref Range    Troponin I, High Sensitivity (CMC) <3 0 - 34 ng/L   Sedimentation rate, automated   Result Value Ref Range    Sedimentation Rate 34 (H) 0 - 30 mm/h   TSH with reflex to Free T4 if abnormal   Result Value Ref Range    Thyroid Stimulating Hormone 1.92 0.44 - 3.98 mIU/L   ECG 12 lead   Result Value Ref Range    Ventricular Rate 104 BPM    Atrial Rate 104 BPM    ND Interval 150 ms    QRS Duration 68 ms    QT Interval 344 ms    QTC Calculation(Bazett) 452 ms    P Axis -5 degrees    R Axis 8 degrees    T Axis -24 degrees    QRS Count 17 beats    Q Onset 224 ms    P Onset 149 ms    P Offset 204 ms    T Offset 396 ms    QTC Fredericia 413 ms   Comprehensive metabolic panel   Result Value Ref Range    Glucose 108 (H) 74 - 99 mg/dL    Sodium 137 136 - 145 mmol/L    Potassium 5.1 3.5 - 5.3 mmol/L    Chloride 105 98 - 107 mmol/L    Bicarbonate 23 21 - 32 mmol/L    Anion Gap 14 10 - 20 mmol/L    Urea Nitrogen 18 6 - 23 mg/dL    Creatinine 0.90 0.50 - 1.05 mg/dL    eGFR 61 >60 mL/min/1.73m*2    Calcium 9.7 8.6 - 10.6 mg/dL    Albumin 4.0 3.4 - 5.0 g/dL    Alkaline Phosphatase 73 33 - 136 U/L    Total Protein 8.0 6.4 - 8.2 g/dL    AST 31 9 - 39 U/L    Bilirubin, Total 1.5 (H) 0.0 - 1.2 mg/dL    ALT 11 7 - 45 U/L   Lipase   Result Value Ref Range     Lipase 38 9 - 82 U/L   Sars-CoV-2 PCR   Result Value Ref Range    Coronavirus 2019, PCR Not Detected Not Detected   Influenza A, and B PCR   Result Value Ref Range    Flu A Result Not Detected Not Detected    Flu B Result Not Detected Not Detected   C-reactive protein   Result Value Ref Range    C-Reactive Protein 0.62 <1.00 mg/dL   Renal Function Panel   Result Value Ref Range    Glucose 78 74 - 99 mg/dL    Sodium 139 136 - 145 mmol/L    Potassium 3.4 (L) 3.5 - 5.3 mmol/L    Chloride 108 (H) 98 - 107 mmol/L    Bicarbonate 23 21 - 32 mmol/L    Anion Gap 11 10 - 20 mmol/L    Urea Nitrogen 22 6 - 23 mg/dL    Creatinine 0.79 0.50 - 1.05 mg/dL    eGFR 71 >60 mL/min/1.73m*2    Calcium 9.1 8.6 - 10.6 mg/dL    Phosphorus 3.0 2.5 - 4.9 mg/dL    Albumin 3.3 (L) 3.4 - 5.0 g/dL   Magnesium   Result Value Ref Range    Magnesium 1.82 1.60 - 2.40 mg/dL   CBC and Auto Differential   Result Value Ref Range    WBC 4.6 4.4 - 11.3 x10*3/uL    nRBC 0.0 0.0 - 0.0 /100 WBCs    RBC 3.74 (L) 4.00 - 5.20 x10*6/uL    Hemoglobin 11.7 (L) 12.0 - 16.0 g/dL    Hematocrit 36.3 36.0 - 46.0 %    MCV 97 80 - 100 fL    MCH 31.3 26.0 - 34.0 pg    MCHC 32.2 32.0 - 36.0 g/dL    RDW 12.7 11.5 - 14.5 %    Platelets 205 150 - 450 x10*3/uL    Neutrophils % 45.0 40.0 - 80.0 %    Immature Granulocytes %, Automated 0.2 0.0 - 0.9 %    Lymphocytes % 37.7 13.0 - 44.0 %    Monocytes % 13.9 2.0 - 10.0 %    Eosinophils % 2.6 0.0 - 6.0 %    Basophils % 0.6 0.0 - 2.0 %    Neutrophils Absolute 2.08 1.60 - 5.50 x10*3/uL    Immature Granulocytes Absolute, Automated 0.01 0.00 - 0.50 x10*3/uL    Lymphocytes Absolute 1.74 0.80 - 3.00 x10*3/uL    Monocytes Absolute 0.64 0.05 - 0.80 x10*3/uL    Eosinophils Absolute 0.12 0.00 - 0.40 x10*3/uL    Basophils Absolute 0.03 0.00 - 0.10 x10*3/uL      XR abdomen 1 view    Result Date: 10/17/2024  STUDY: XR ABDOMEN 1 VIEW;  10/17/2024 7:27 am   INDICATION: Signs/Symptoms:abdominal pain, diarrhea, c/f SBO.     COMPARISON: CT abdomen  pelvis 10/16/2024   ACCESSION NUMBER(S): [id number]   ORDERING CLINICIAN: DAX DIAZ   FINDINGS:   Numerous prominent small bowel loops measuring up to 3.5 cm can be seen in ileus versus partial small bowel obstruction. Limited evaluation of pneumoperitoneum on supine imaging, however no gross evidence of free air is noted.   Visualized lungs are clear.   Osseous structures demonstrate no acute bony changes.       1. Numerous prominent small bowel loops measuring up to 3.5 cm can be seen in ileus versus partial small bowel obstruction. Recommend NG decompression and serial abdominal radiographs to ensure resolution.     I personally reviewed the images/study and I agree with the findings as stated by Dr. Gordon Meehan. This study was interpreted at [facility], [place], [place].   MACRO: None     Dictation workstation:   [id number]     Hospital Course:  Jojo Luis is a 89 y.o. female with a PMH of HTN, HLD, breast cancer, MI, arthritis, DHARMESH, afib, and Hep C. Pt presented to ED for further evaluation of cramping abdominal pain, diarrhea, cough and SOB that started 7 hrs prior to ED presentation.  After evaluation patient states she only has nasal congestion as her main respiratory symptom.  Imaging finding indicated segments of inflammation of part of bowel which appears to be causing partial bowel obstruction.    Assessment/Plan     Colitis  abdominal pain with diarrhea  -CT A/P: There are multiple segments of inflamed small bowel with wall thickening interspersed with intervening segments of mildly dilated loops of bowel. Findings suggest inflammatory strictures causing partial obstruction.  -?  Viral enteritis  -ACS following.  -pt actively passing flatus, has bowel sounds, denies n/v.  Continue to monitor clinically.  Patient so far tolerating diet.  Pain is currently described as 4/10.  -KUB today shows persistent symptoms, will continue to monitor.  -stool pathogen PCR and  C-diff PCR pending collection if patient has loose watery stools.  -apply hot packs to abdomen to help with abdominal discomfort  -encourage PO fluid intake     cough  SOB  -Patient has currently denying any cough or shortness of breath.  She states the only respiratory symptom she has is nasal congestion.  -Robitussin q4h PRN cough     HTN  -Current systolic blood pressure 110s.  -continue home dose of Norvasc 5 mg PO every day and Losartan 25 mg PO every day (hold for SBP<110)     Anxiety  -continue Atarax 5 mg PO TID PRN anxiety    Disposition: Monitor ability to tolerate oral diet, monitor for flatus and bowel movements, examine abdomen daily, KUB daily.  Suspect viral enteritis.    Pam Herrera MD

## 2024-10-17 NOTE — CARE PLAN
The patient's goals for the shift include Patient will remain safe and free from injury.    The clinical goals for the shift include Patient will remain HDS throughout shift.    Problem: Pain - Adult  Goal: Verbalizes/displays adequate comfort level or baseline comfort level  Outcome: Progressing     Problem: Safety - Adult  Goal: Free from fall injury  Outcome: Progressing     Problem: Discharge Planning  Goal: Discharge to home or other facility with appropriate resources  Outcome: Progressing     Problem: Chronic Conditions and Co-morbidities  Goal: Patient's chronic conditions and co-morbidity symptoms are monitored and maintained or improved  Outcome: Progressing     Problem: Pain  Goal: Takes deep breaths with improved pain control throughout the shift  Outcome: Progressing  Goal: Turns in bed with improved pain control throughout the shift  Outcome: Progressing  Goal: Walks with improved pain control throughout the shift  Outcome: Progressing  Goal: Performs ADL's with improved pain control throughout shift  Outcome: Progressing  Goal: Participates in PT with improved pain control throughout the shift  Outcome: Progressing  Goal: Free from opioid side effects throughout the shift  Outcome: Progressing  Goal: Free from acute confusion related to pain meds throughout the shift  Outcome: Progressing

## 2024-10-17 NOTE — PROGRESS NOTES
[facility]  ACUTE CARE SURGERY - PROGRESS NOTE    Patient Name: Jojo Luis  MRN: [id number]  Admit Date: 1016  : 3/5/1934  AGE: 89 y.o.   GENDER: female  ==============================================================================  TODAY'S ASSESSMENT AND PLAN OF CARE:  89 y/o F w/ PSHx significant for A fib, HTN, HLD, MI, Hepatitis C, colon cancer s/p right hemicolectomy, SBO s/p ex lap and ZOIE, ventral hernia repair who presents with diarrhea and nausea and CT scan showing loops of mildly dilated small bowel and colonic wall thickening. ACS consulted for concerns of partial bowel obstruction. When she was seen overnight she had a soft nondistended abdomen and was passing flatus and having bowel function. However overnight she reports she has not been passing gas and feels more distended    Recommendations:  - Keep patient NPO  - Would recommend gastrograffin challenge  - ACS will follow with serial abdominal exams. When seen at 6 am and 11:30 am today patient reports there had been no interval change and her abdomen was still soft and nontender but was mildly distended    Discussed with attending Dr. Pastora Diaz MD  PGY1 Acute Care Surgery  Pager [phone]      ==============================================================================  CHIEF COMPLAINT / EVENTS LAST 24HRS / HPI:  Overnight pt reports no longer passing gas and having bowel function, feels more distended when seen at 6 am. No interval change at 11:30 am. Patient denies nausea or emesis    MEDICAL HISTORY / ROS:   Admission history and ROS reviewed. Pertinent changes as follows: None new    PHYSICAL EXAM:  Heart Rate:  []   Temp:  [36.5 °C (97.7 °F)]   Resp:  [15-16]   BP: (101-128)/(67-87)   Weight:  [56.9 kg (125 lb 8 oz)]   SpO2:  [97 %-100 %]   Physical Exam  Constitutional:       General: She is not in acute distress.     Appearance: Normal appearance.   Cardiovascular:      Rate and  Rhythm: Normal rate.   Pulmonary:      Effort: Pulmonary effort is normal. No respiratory distress.      Breath sounds: No wheezing.   Abdominal:      General: Abdomen is flat.      Palpations: Abdomen is soft. There is no mass.      Tenderness: There is no abdominal tenderness.      Comments: Mildly distended. No guarding, rebound   Neurological:      Mental Status: She is alert.         IMAGING SUMMARY:   KUB  1. Numerous prominent small bowel loops measuring up to 3.5 cm can be  seen in ileus versus partial small bowel obstruction.    LABS:  Results from last 7 days   Lab Units 10/17/24  0531 10/16/24  1123   WBC AUTO x10*3/uL 4.6 7.8   HEMOGLOBIN g/dL 11.7* 14.0   HEMATOCRIT % 36.3 42.7   PLATELETS AUTO x10*3/uL 205 204   NEUTROS PCT AUTO % 45.0 82.6   LYMPHS PCT AUTO % 37.7 8.6   MONOS PCT AUTO % 13.9 7.7   EOS PCT AUTO % 2.6 0.4         Results from last 7 days   Lab Units 10/17/24  0530 10/16/24  1332   SODIUM mmol/L 139 137   POTASSIUM mmol/L 3.4* 5.1   CHLORIDE mmol/L 108* 105   CO2 mmol/L 23 23   BUN mg/dL 22 18   CREATININE mg/dL 0.79 0.90   CALCIUM mg/dL 9.1 9.7   PROTEIN TOTAL g/dL  --  8.0   BILIRUBIN TOTAL mg/dL  --  1.5*   ALK PHOS U/L  --  73   ALT U/L  --  11   AST U/L  --  31   GLUCOSE mg/dL 78 108*     Results from last 7 days   Lab Units 10/16/24  1332   BILIRUBIN TOTAL mg/dL 1.5*           I have reviewed all medications, laboratory results, and imaging pertinent for today's encounter.

## 2024-10-18 ENCOUNTER — APPOINTMENT (OUTPATIENT)
Dept: RADIOLOGY | Facility: HOSPITAL | Age: 89
End: 2024-10-18
Payer: COMMERCIAL

## 2024-10-18 LAB
ANION GAP SERPL CALC-SCNC: 10 MMOL/L (ref 10–20)
BUN SERPL-MCNC: 19 MG/DL (ref 6–23)
CALCIUM SERPL-MCNC: 9.4 MG/DL (ref 8.6–10.6)
CHLORIDE SERPL-SCNC: 108 MMOL/L (ref 98–107)
CO2 SERPL-SCNC: 25 MMOL/L (ref 21–32)
CREAT SERPL-MCNC: 0.82 MG/DL (ref 0.5–1.05)
EGFRCR SERPLBLD CKD-EPI 2021: 68 ML/MIN/1.73M*2
GLUCOSE SERPL-MCNC: 111 MG/DL (ref 74–99)
POTASSIUM SERPL-SCNC: 3.2 MMOL/L (ref 3.5–5.3)
SODIUM SERPL-SCNC: 140 MMOL/L (ref 136–145)

## 2024-10-18 PROCEDURE — 80048 BASIC METABOLIC PNL TOTAL CA: CPT | Performed by: INTERNAL MEDICINE

## 2024-10-18 PROCEDURE — 74018 RADEX ABDOMEN 1 VIEW: CPT | Performed by: RADIOLOGY

## 2024-10-18 PROCEDURE — 36415 COLL VENOUS BLD VENIPUNCTURE: CPT | Performed by: INTERNAL MEDICINE

## 2024-10-18 PROCEDURE — 87506 IADNA-DNA/RNA PROBE TQ 6-11: CPT | Performed by: NURSE PRACTITIONER

## 2024-10-18 PROCEDURE — 74018 RADEX ABDOMEN 1 VIEW: CPT

## 2024-10-18 PROCEDURE — 2500000001 HC RX 250 WO HCPCS SELF ADMINISTERED DRUGS (ALT 637 FOR MEDICARE OP): Performed by: INTERNAL MEDICINE

## 2024-10-18 PROCEDURE — 99231 SBSQ HOSP IP/OBS SF/LOW 25: CPT | Performed by: SURGERY

## 2024-10-18 PROCEDURE — 1100000001 HC PRIVATE ROOM DAILY

## 2024-10-18 PROCEDURE — 2500000004 HC RX 250 GENERAL PHARMACY W/ HCPCS (ALT 636 FOR OP/ED): Performed by: NURSE PRACTITIONER

## 2024-10-18 PROCEDURE — 2500000004 HC RX 250 GENERAL PHARMACY W/ HCPCS (ALT 636 FOR OP/ED): Performed by: INTERNAL MEDICINE

## 2024-10-18 PROCEDURE — 2500000001 HC RX 250 WO HCPCS SELF ADMINISTERED DRUGS (ALT 637 FOR MEDICARE OP): Performed by: NURSE PRACTITIONER

## 2024-10-18 PROCEDURE — 99233 SBSQ HOSP IP/OBS HIGH 50: CPT | Performed by: INTERNAL MEDICINE

## 2024-10-18 PROCEDURE — 87493 C DIFF AMPLIFIED PROBE: CPT | Performed by: NURSE PRACTITIONER

## 2024-10-18 RX ORDER — ONDANSETRON HYDROCHLORIDE 2 MG/ML
4 INJECTION, SOLUTION INTRAVENOUS EVERY 6 HOURS PRN
Status: DISCONTINUED | OUTPATIENT
Start: 2024-10-18 | End: 2024-10-20 | Stop reason: HOSPADM

## 2024-10-18 RX ORDER — POTASSIUM CHLORIDE 14.9 MG/ML
20 INJECTION INTRAVENOUS
Status: COMPLETED | OUTPATIENT
Start: 2024-10-18 | End: 2024-10-18

## 2024-10-18 RX ORDER — DEXTROSE MONOHYDRATE AND SODIUM CHLORIDE 5; .9 G/100ML; G/100ML
75 INJECTION, SOLUTION INTRAVENOUS CONTINUOUS
Status: DISPENSED | OUTPATIENT
Start: 2024-10-18 | End: 2024-10-19

## 2024-10-18 RX ORDER — ACETAMINOPHEN 325 MG/1
650 TABLET ORAL EVERY 6 HOURS PRN
COMMUNITY

## 2024-10-18 ASSESSMENT — COGNITIVE AND FUNCTIONAL STATUS - GENERAL
MOBILITY SCORE: 24
DAILY ACTIVITIY SCORE: 24
MOBILITY SCORE: 24
DAILY ACTIVITIY SCORE: 24

## 2024-10-18 ASSESSMENT — PAIN - FUNCTIONAL ASSESSMENT
PAIN_FUNCTIONAL_ASSESSMENT: 0-10

## 2024-10-18 ASSESSMENT — PAIN SCALES - GENERAL
PAINLEVEL_OUTOF10: 3
PAINLEVEL_OUTOF10: 0 - NO PAIN

## 2024-10-18 ASSESSMENT — PAIN DESCRIPTION - DESCRIPTORS: DESCRIPTORS: ACHING

## 2024-10-18 ASSESSMENT — PAIN DESCRIPTION - LOCATION: LOCATION: HEAD

## 2024-10-18 NOTE — PROGRESS NOTES
"Pharmacy Medication History Review    Jojo Luis is a 89 y.o. female admitted for Colitis. Pharmacy reviewed the patient's [id number]-zzcuvxhtd medications and allergies for accuracy.    Medications ADDED:  Tylenol 325mg q6h  Medications CHANGED:  none  Medications REMOVED:   none    The list below reflects the updated PTA list.   Prior to Admission Medications   Prescriptions Informant   acetaminophen (Tylenol) 325 mg tablet Self   Sig: Take 2 tablets (650 mg) by mouth every 6 hours if needed (For up to 10 days).   amLODIPine (Norvasc) 5 mg tablet Self   Sig: TAKE 1 TABLET (5 MG) BY MOUTH ONCE DAILY.   hydrOXYzine HCL (Atarax) 10 mg tablet Self   Sig: Take 0.5 tablets (5 mg) by mouth 3 times a day.   losartan (Cozaar) 25 mg tablet Self   Sig: Take 1 tablet (25 mg) by mouth once daily.   Patient not taking: Reported on 10/18/2024   minoxidil (Rogaine) 2 % external solution Self   Sig: Apply topically 2 times a day.      Facility-Administered Medications: None        The list below reflects the updated allergy list. Please review each documented allergy for additional clarification and justification.  Allergies  Reviewed by Josie Phillips, APRN-CNP on 10/16/2024   No Known Allergies         Patient accepts M2B at discharge.     Sources:   Good historian. Interviewed at bedside, able to confirm medications. Also had brought medications from home to confirm; meds brought from home match that of PTA list  Review of out patient fill history, OARRS, and patient interview    Additional Comments:  Patient is not taking losartan, last filled 2/2024  Minoxidil shampoo prescribed 10/11 for hair loss, hasn't used yet, had no questions / concerns      Rodri Jonas PharmD  Transitions of Care Pharmacist  10/18/24     Secure Chat preferred   If no response call [phone] or adSage \"Med Rec\"    "

## 2024-10-18 NOTE — PROGRESS NOTES
"Jojo Luis is a 89 y.o. female on day 1 of admission presenting with Colitis.    Subjective   Lying in bed. No distress.  Patient reports mild nausea this morning.  She did develop some abdominal distention in the afternoon yesterday but now states that is back to her normal size.  Patient reports watery bowel movement yesterday in the late afternoon and then watery bowel movement this morning.  She feels bloating discomfort but no severe pain.    Objective     General: Lying in bed without distress.  Cooperative.  Skin: No rashes or ulcerations.  Old surgical scar noted on abdomen over umbilicus going down towards suprapubic area.  HEENT: Sclera is white.  Mucous membranes moist.  Neck: Supple.  No JVD.  Cardiac: Regular rate and rhythm, S1/S2 normal.  Lungs: Clear to auscultation bilaterally, no wheezing or crackles, no accessory muscle use at rest.  Abdomen: Soft, minimal tenderness with palpation to the area above the suprapubic, mildly obese, bowel sounds intermittent  Extremities: No cyanosis.  No lower extremity edema.  Neurologic: Alert and oriented x3.  No focal deficits.  Psychiatric: Appropriate mood and behavior.  Currently no agitation.    Last Recorded Vitals  Blood pressure 114/71, pulse 94, temperature 36.5 °C (97.7 °F), temperature source Temporal, resp. rate 16, height 1.626 m (5' 4\"), weight 56.9 kg (125 lb 8 oz), SpO2 99%.  On room air.    Intake/Output last 3 Shifts:  No intake/output data recorded.    Relevant Results  amLODIPine, 5 mg, oral, Daily  enoxaparin, 40 mg, subcutaneous, q24h  losartan, 25 mg, oral, Daily  simethicone, 80 mg, oral, 4x daily       D5 % and 0.9 % sodium chloride, 75 mL/hr, Last Rate: 75 mL/hr (10/18/24 0644)       PRN medications: acetaminophen **OR** acetaminophen, guaiFENesin, hydrOXYzine HCL     No results found for this or any previous visit (from the past 24 hours).       Hospital Course:  Jojo Luis is a 89 y.o. female with a PMH of HTN, HLD, breast cancer, MI, " arthritis, DHARMESH, afib, and Hep C. Pt presented to ED for further evaluation of cramping abdominal pain, diarrhea, cough and SOB that started 7 hrs prior to ED presentation.  After evaluation patient states she only has nasal congestion as her main respiratory symptom.  Imaging finding indicated segments of inflammation of part of bowel which appears to be causing partial bowel obstruction.  Patient was still tolerating oral intake and still passing gas and reporting bowel movements.    Assessment/Plan     Colitis  Abdominal pain with diarrhea  -CT A/P: There are multiple segments of inflamed small bowel with wall thickening interspersed with intervening segments of mildly dilated loops of bowel. Findings suggest inflammatory strictures causing partial obstruction.  -?  Viral enteritis  -ACS yesterday afternoon recommended patient to be n.p.o. and be given Gastrografin challenge.  Await further input today.  -Patient reports watery bowel movement yesterday late afternoon and watery bowel movement this morning.  -KUB done 6 hours after Gastrografin given and KUB done this morning pending radiology read.  -stool pathogen PCR and C-diff PCR ordered   -apply hot packs to abdomen to help with abdominal discomfort.     HTN  -Current systolic blood pressure 110s - 120s await further input.  -continue home dose of Norvasc 5 mg PO every day and Losartan 25 mg PO every day (hold for SBP<110).     Anxiety  -continue Atarax 5 mg PO TID PRN anxiety    Disposition: Await further input from general surgery, await KUB x-ray reading from radiology.  Monitor bowel movements.    Pam Herrera MD

## 2024-10-18 NOTE — CARE PLAN
The patient's goals for the shift include Patient will remain safe and free from injury.    The clinical goals for the shift include Pt to be free from falls this shift      Problem: Pain - Adult  Goal: Verbalizes/displays adequate comfort level or baseline comfort level  Outcome: Progressing     Problem: Safety - Adult  Goal: Free from fall injury  Outcome: Progressing     Problem: Pain  Goal: Takes deep breaths with improved pain control throughout the shift  Outcome: Progressing  Goal: Turns in bed with improved pain control throughout the shift  Outcome: Progressing  Goal: Walks with improved pain control throughout the shift  Outcome: Progressing  Goal: Performs ADL's with improved pain control throughout shift  Outcome: Progressing  Goal: Participates in PT with improved pain control throughout the shift  Outcome: Progressing  Goal: Free from opioid side effects throughout the shift  Outcome: Progressing  Goal: Free from acute confusion related to pain meds throughout the shift  Outcome: Progressing

## 2024-10-18 NOTE — PROGRESS NOTES
[facility]  ACUTE CARE SURGERY - PROGRESS NOTE    Patient Name: Jojo Luis  MRN: [id number]  Admit Date: 1016  : 3/5/1934  AGE: 89 y.o.   GENDER: female  ==============================================================================  TODAY'S ASSESSMENT AND PLAN OF CARE:  89 y/o F w/ PSHx significant for colon cancer s/p right hemicolectomy, SBO s/p ex lap and ZOIE, ventral hernia repair who presents with Patient continues to have bowel function (passing gas, multiple bowel movements), denies nausea and vomiting, and has a benign abdominal exam. Labs obtained on admit unremarkable with WBC 7.8 with mild left shift. Vitals showed initial mild tachycardia, which quickly improved with fluid resuscitation. CT showed loops of mildly dilated small bowel and colonic wall thickening. ACS consulted for concerns of partial bowel obstruction.     10/18: Patient having diarrhea this morning. Abdominal exam stable. KUB shows contrast not passing distal to stomach in hiatal hernia sac.     Recommendations:   - Obtain daily KUBs to monitor for contrast progression   - Continue medical workup for possible colitis (C diff, stool studies)     Patient discussed with attending Dr. Jauregui.    Portia Overton  PGY2 General Surgery   ACS [id number]     ==============================================================================  CHIEF COMPLAINT / EVENTS LAST 24HRS / HPI:  NAOE. Started having diarrhea early this morning. No nausea or vomiting. Diarrhea is making her feel uncomfortable but she denies abdominal pain.     MEDICAL HISTORY / ROS:   Admission history and ROS reviewed. Pertinent changes as follows:  None     PHYSICAL EXAM:  Heart Rate:  [90-94]   Temp:  [36.5 °C (97.7 °F)-37 °C (98.6 °F)]   Resp:  [16]   BP: (114)/(63-71)   SpO2:  [99 %-100 %]   Physical Exam  Constitutional:       Appearance: She is not toxic-appearing.   Eyes:      Extraocular Movements: Extraocular movements intact.    Cardiovascular:      Rate and Rhythm: Normal rate.   Pulmonary:      Effort: No respiratory distress.   Abdominal:      General: There is no distension.      Palpations: Abdomen is soft.      Tenderness: There is no abdominal tenderness.   Musculoskeletal:         General: Normal range of motion.   Skin:     General: Skin is warm and dry.   Neurological:      General: No focal deficit present.      Mental Status: She is alert.       IMAGING SUMMARY:    KUB   Contrast in stomach, does not pass distal to stomach    LABS:  Results from last 7 days   Lab Units 10/17/24  0531 10/16/24  1123   WBC AUTO x10*3/uL 4.6 7.8   HEMOGLOBIN g/dL 11.7* 14.0   HEMATOCRIT % 36.3 42.7   PLATELETS AUTO x10*3/uL 205 204   NEUTROS PCT AUTO % 45.0 82.6   LYMPHS PCT AUTO % 37.7 8.6   MONOS PCT AUTO % 13.9 7.7   EOS PCT AUTO % 2.6 0.4         Results from last 7 days   Lab Units 10/17/24  0530 10/16/24  1332   SODIUM mmol/L 139 137   POTASSIUM mmol/L 3.4* 5.1   CHLORIDE mmol/L 108* 105   CO2 mmol/L 23 23   BUN mg/dL 22 18   CREATININE mg/dL 0.79 0.90   CALCIUM mg/dL 9.1 9.7   PROTEIN TOTAL g/dL  --  8.0   BILIRUBIN TOTAL mg/dL  --  1.5*   ALK PHOS U/L  --  73   ALT U/L  --  11   AST U/L  --  31   GLUCOSE mg/dL 78 108*     Results from last 7 days   Lab Units 10/16/24  1332   BILIRUBIN TOTAL mg/dL 1.5*           I have reviewed all medications, laboratory results, and imaging pertinent for today's encounter.

## 2024-10-18 NOTE — PROGRESS NOTES
I reviewed the resident/fellow's documentation and discussed the patient with the resident/fellow. I agree with the resident/fellow's medical decision making as documented in the note.    Ivory Espinal MD

## 2024-10-18 NOTE — CARE PLAN
The patient's goals for the shift include Patient will remain safe and free from injury.    The clinical goals for the shift include Pt to have decreased abd bloating and cramping this shift

## 2024-10-19 ENCOUNTER — APPOINTMENT (OUTPATIENT)
Dept: RADIOLOGY | Facility: HOSPITAL | Age: 89
End: 2024-10-19
Payer: COMMERCIAL

## 2024-10-19 LAB
ALBUMIN SERPL BCP-MCNC: 3.2 G/DL (ref 3.4–5)
ANION GAP SERPL CALC-SCNC: 14 MMOL/L (ref 10–20)
BUN SERPL-MCNC: 14 MG/DL (ref 6–23)
C COLI+JEJ+UPSA DNA STL QL NAA+PROBE: NOT DETECTED
C DIF TOX TCDA+TCDB STL QL NAA+PROBE: NOT DETECTED
CALCIUM SERPL-MCNC: 9.3 MG/DL (ref 8.6–10.6)
CHLORIDE SERPL-SCNC: 114 MMOL/L (ref 98–107)
CO2 SERPL-SCNC: 17 MMOL/L (ref 21–32)
CREAT SERPL-MCNC: 0.75 MG/DL (ref 0.5–1.05)
EC STX1 GENE STL QL NAA+PROBE: NOT DETECTED
EC STX2 GENE STL QL NAA+PROBE: NOT DETECTED
EGFRCR SERPLBLD CKD-EPI 2021: 76 ML/MIN/1.73M*2
GLUCOSE BLD MANUAL STRIP-MCNC: 90 MG/DL (ref 74–99)
GLUCOSE SERPL-MCNC: 76 MG/DL (ref 74–99)
NOROVIRUS GI + GII RNA STL NAA+PROBE: NOT DETECTED
PHOSPHATE SERPL-MCNC: 2.6 MG/DL (ref 2.5–4.9)
POTASSIUM SERPL-SCNC: 3.9 MMOL/L (ref 3.5–5.3)
RV RNA STL NAA+PROBE: NOT DETECTED
SALMONELLA DNA STL QL NAA+PROBE: NOT DETECTED
SHIGELLA DNA SPEC QL NAA+PROBE: NOT DETECTED
SODIUM SERPL-SCNC: 141 MMOL/L (ref 136–145)
V CHOLERAE DNA STL QL NAA+PROBE: NOT DETECTED
Y ENTEROCOL DNA STL QL NAA+PROBE: NOT DETECTED

## 2024-10-19 PROCEDURE — 2500000001 HC RX 250 WO HCPCS SELF ADMINISTERED DRUGS (ALT 637 FOR MEDICARE OP): Performed by: NURSE PRACTITIONER

## 2024-10-19 PROCEDURE — 2500000004 HC RX 250 GENERAL PHARMACY W/ HCPCS (ALT 636 FOR OP/ED): Performed by: INTERNAL MEDICINE

## 2024-10-19 PROCEDURE — 82947 ASSAY GLUCOSE BLOOD QUANT: CPT

## 2024-10-19 PROCEDURE — RXMED WILLOW AMBULATORY MEDICATION CHARGE

## 2024-10-19 PROCEDURE — 74018 RADEX ABDOMEN 1 VIEW: CPT

## 2024-10-19 PROCEDURE — 1100000001 HC PRIVATE ROOM DAILY

## 2024-10-19 PROCEDURE — 99231 SBSQ HOSP IP/OBS SF/LOW 25: CPT | Performed by: SURGERY

## 2024-10-19 PROCEDURE — 2500000001 HC RX 250 WO HCPCS SELF ADMINISTERED DRUGS (ALT 637 FOR MEDICARE OP): Performed by: INTERNAL MEDICINE

## 2024-10-19 PROCEDURE — 36415 COLL VENOUS BLD VENIPUNCTURE: CPT | Performed by: INTERNAL MEDICINE

## 2024-10-19 PROCEDURE — 80069 RENAL FUNCTION PANEL: CPT | Performed by: INTERNAL MEDICINE

## 2024-10-19 PROCEDURE — 2500000004 HC RX 250 GENERAL PHARMACY W/ HCPCS (ALT 636 FOR OP/ED): Performed by: NURSE PRACTITIONER

## 2024-10-19 PROCEDURE — 99232 SBSQ HOSP IP/OBS MODERATE 35: CPT | Performed by: INTERNAL MEDICINE

## 2024-10-19 PROCEDURE — 74018 RADEX ABDOMEN 1 VIEW: CPT | Performed by: RADIOLOGY

## 2024-10-19 RX ORDER — SIMETHICONE 80 MG
80 TABLET,CHEWABLE ORAL EVERY 6 HOURS PRN
Qty: 120 TABLET | Refills: 3 | Status: SHIPPED | OUTPATIENT
Start: 2024-10-19

## 2024-10-19 RX ORDER — ONDANSETRON 4 MG/1
4 TABLET, ORALLY DISINTEGRATING ORAL EVERY 6 HOURS PRN
Qty: 60 TABLET | Refills: 3 | Status: SHIPPED | OUTPATIENT
Start: 2024-10-19

## 2024-10-19 ASSESSMENT — COGNITIVE AND FUNCTIONAL STATUS - GENERAL
DAILY ACTIVITIY SCORE: 24
MOBILITY SCORE: 24
MOBILITY SCORE: 24
DAILY ACTIVITIY SCORE: 24

## 2024-10-19 ASSESSMENT — PAIN SCALES - GENERAL
PAINLEVEL_OUTOF10: 0 - NO PAIN
PAINLEVEL_OUTOF10: 3
PAINLEVEL_OUTOF10: 0 - NO PAIN
PAINLEVEL_OUTOF10: 0 - NO PAIN
PAINLEVEL_OUTOF10: 1

## 2024-10-19 ASSESSMENT — PAIN DESCRIPTION - LOCATION: LOCATION: HEAD

## 2024-10-19 ASSESSMENT — PAIN - FUNCTIONAL ASSESSMENT
PAIN_FUNCTIONAL_ASSESSMENT: 0-10

## 2024-10-19 NOTE — PROGRESS NOTES
[facility]  ACUTE CARE SURGERY - PROGRESS NOTE    Patient Name: Jojo Luis  MRN: [id number]  Admit Date: 1016  : 3/5/1934  AGE: 89 y.o.   GENDER: female  ==============================================================================  TODAY'S ASSESSMENT AND PLAN OF CARE:  89 y/o F w/ PSHx significant for colon cancer s/p right hemicolectomy, SBO s/p ex lap and ZOIE, ventral hernia repair who presents with Patient continues to have bowel function (passing gas, multiple bowel movements), denies nausea and vomiting, and has a benign abdominal exam. Labs obtained on admit unremarkable with WBC 7.8 with mild left shift. Vitals showed initial mild tachycardia, which quickly improved with fluid resuscitation. CT showed loops of mildly dilated small bowel and colonic wall thickening. ACS consulted for concerns of partial bowel obstruction.     Recommendations:   - KUB showed progression of contrast to the colon. Pt has also had 2 significant diarrhea-like bowel movements in the last 24 hours. She endorses improvement of her symptoms with no feelings of bloating anymore  - Pending possible colitis (C diff, stool studies)   - Very unlikely to be SBO at this time. ACS available if patient's clinical status changes    Patient discussed with attending Dr. Jauregui.    Vickie Diaz MD  PGY1 Acute Care Surgery  Pager [phone]    ==============================================================================  CHIEF COMPLAINT / EVENTS LAST 24HRS / HPI:  NAOE. Has had two large volume liquid bowel movements overnight. Pt reports she no longer feels bloated    MEDICAL HISTORY / ROS:   Admission history and ROS reviewed. Pertinent changes as follows:  None     PHYSICAL EXAM:  Heart Rate:  [73-87]   Temp:  [36.7 °C (98.1 °F)]   Resp:  [16-18]   BP: (117-149)/(76-85)   SpO2:  [98 %-100 %]   Physical Exam  Constitutional:       Appearance: She is not toxic-appearing.   Eyes:      Extraocular Movements:  Extraocular movements intact.   Cardiovascular:      Rate and Rhythm: Normal rate.   Pulmonary:      Effort: No respiratory distress.   Abdominal:      General: There is no distension.      Palpations: Abdomen is soft.      Tenderness: There is no abdominal tenderness.   Musculoskeletal:         General: Normal range of motion.   Skin:     General: Skin is warm and dry.   Neurological:      General: No focal deficit present.      Mental Status: She is alert.       IMAGING SUMMARY:    IMPRESSION:  1.  Interval progression of contrast material which now opacifies the  distal colon.  2. Prominent air-filled loops of colon which are nonspecific.  Overall, the bowel-gas pattern is nonobstructive on this exam.    LABS:  Results from last 7 days   Lab Units 10/17/24  0531 10/16/24  1123   WBC AUTO x10*3/uL 4.6 7.8   HEMOGLOBIN g/dL 11.7* 14.0   HEMATOCRIT % 36.3 42.7   PLATELETS AUTO x10*3/uL 205 204   NEUTROS PCT AUTO % 45.0 82.6   LYMPHS PCT AUTO % 37.7 8.6   MONOS PCT AUTO % 13.9 7.7   EOS PCT AUTO % 2.6 0.4         Results from last 7 days   Lab Units 10/19/24  0502 10/18/24  0948 10/17/24  0530 10/16/24  1332   SODIUM mmol/L 141 140 139 137   POTASSIUM mmol/L 3.9 3.2* 3.4* 5.1   CHLORIDE mmol/L 114* 108* 108* 105   CO2 mmol/L 17* 25 23 23   BUN mg/dL 14 19 22 18   CREATININE mg/dL 0.75 0.82 0.79 0.90   CALCIUM mg/dL 9.3 9.4 9.1 9.7   PROTEIN TOTAL g/dL  --   --   --  8.0   BILIRUBIN TOTAL mg/dL  --   --   --  1.5*   ALK PHOS U/L  --   --   --  73   ALT U/L  --   --   --  11   AST U/L  --   --   --  31   GLUCOSE mg/dL 76 111* 78 108*     Results from last 7 days   Lab Units 10/16/24  1332   BILIRUBIN TOTAL mg/dL 1.5*           I have reviewed all medications, laboratory results, and imaging pertinent for today's encounter.

## 2024-10-19 NOTE — PROGRESS NOTES
"Jojo Luis is a 89 y.o. female on day 2 of admission presenting with Colitis.    Subjective   Lying in bed. No distress.  Patient reports nausea from yesterday resolved.  Denies any abdominal bloating or distention.  Denies any abdominal pain.  She had another bowel movement this morning but describes it as watery.  Tolerated diet.    Objective     General: Lying in bed without distress.  Cooperative.  Skin: No rashes or ulcerations.  Old surgical scar noted on abdomen over umbilicus going down towards suprapubic area.  HEENT: Sclera is white.  Mucous membranes moist.  Neck: Supple.  No JVD.  Cardiac: Regular rate and rhythm, S1/S2 normal.  Lungs: Clear to auscultation bilaterally, no wheezing or crackles, no accessory muscle use at rest.  Abdomen: Soft, no tenderness with palpation, mildly obese, bowel sounds intermittent  Extremities: No cyanosis.  No lower extremity edema.  Neurologic: Alert and oriented x3.  No focal deficits.  Psychiatric: Appropriate mood and behavior.  Currently no agitation.    Last Recorded Vitals  Blood pressure 120/73, pulse 87, temperature 36.6 °C (97.9 °F), resp. rate 14, height 1.626 m (5' 4\"), weight 56.9 kg (125 lb 8 oz), SpO2 100%.  On room air.    Intake/Output last 3 Shifts:  I/O last 3 completed shifts:  In: 3208.8 (56.4 mL/kg) [P.O.:240; I.V.:2868.8 (50.4 mL/kg); IV Piggyback:100]  Out: - (0 mL/kg)   Weight: 56.9 kg     Relevant Results  amLODIPine, 5 mg, oral, Daily  enoxaparin, 40 mg, subcutaneous, q24h  simethicone, 80 mg, oral, 4x daily             PRN medications: acetaminophen **OR** acetaminophen, guaiFENesin, hydrOXYzine HCL, ondansetron     Results for orders placed or performed during the hospital encounter of 10/16/24 (from the past 24 hours)   Renal Function Panel   Result Value Ref Range    Glucose 76 74 - 99 mg/dL    Sodium 141 136 - 145 mmol/L    Potassium 3.9 3.5 - 5.3 mmol/L    Chloride 114 (H) 98 - 107 mmol/L    Bicarbonate 17 (L) 21 - 32 mmol/L    Anion Gap 14 " Second portal message sent to Pt's Mother. Waiting on response.   10 - 20 mmol/L    Urea Nitrogen 14 6 - 23 mg/dL    Creatinine 0.75 0.50 - 1.05 mg/dL    eGFR 76 >60 mL/min/1.73m*2    Calcium 9.3 8.6 - 10.6 mg/dL    Phosphorus 2.6 2.5 - 4.9 mg/dL    Albumin 3.2 (L) 3.4 - 5.0 g/dL          Hospital Course:  Jojo Luis is a 89 y.o. female with a PMH of HTN, HLD, breast cancer, MI, arthritis, DHARMESH, afib, and Hep C. Pt presented to ED for further evaluation of cramping abdominal pain, diarrhea, cough and SOB that started 7 hrs prior to ED presentation.  After evaluation patient states she only has nasal congestion as her main respiratory symptom.  Imaging finding indicated segments of inflammation of part of bowel which appears to be causing partial bowel obstruction.  Patient was still tolerating oral intake and still passing gas and reporting bowel movements.  Acute care surgery had been consulted.  ACS recommended n.p.o. due to history of bowel obstruction and monitoring.  Patient was given Gastrografin challenge and KUB x-rays monitor.  Patient remained minimally symptomatic.  On 10/19 ACS indicated unlikely to be by obstruction issue.  Patient started on diet on 10/19.    Assessment/Plan     Colitis  Suspect viral gastroenteritis  -CT A/P: There are multiple segments of inflamed small bowel with wall thickening interspersed with intervening segments of mildly dilated loops of bowel. Findings suggest inflammatory strictures causing partial obstruction.  -Suspect viral gastroenteritis causing some colonic inflammation.  -ACS recommendations reviewed, unlikely to be SBO.  -C. difficile negative.  -KUB currently showing nonobstructive bowel gas pattern.     HTN  -Current systolic blood pressure 110s - 120s await further input.  -continue home dose of Norvasc 5 mg PO every day.  Per pharmacy reported home losartan 25 mg PO every day had been discontinued outpatient prior to admission.     Anxiety  -continue Atarax 5 mg PO TID PRN anxiety    Disposition: Monitor on diet, plan to  discharge tomorrow morning.    Pam Herrera MD

## 2024-10-19 NOTE — CARE PLAN
The patient's goals for the shift include reduced gi discomfort    The clinical goals for the shift include pt will remain free from injury this shift.

## 2024-10-19 NOTE — CARE PLAN
Problem: Pain - Adult  Goal: Verbalizes/displays adequate comfort level or baseline comfort level  Outcome: Progressing     Problem: Safety - Adult  Goal: Free from fall injury  Outcome: Progressing   The patient's goals for the shift include Patient will remain safe and free from injury.    The clinical goals for the shift include pt will remain free from injury

## 2024-10-19 NOTE — CARE PLAN
The patient's goals for the shift include Patient will remain safe and free from injury.    The clinical goals for the shift include pt will remain free from injury

## 2024-10-20 ENCOUNTER — PHARMACY VISIT (OUTPATIENT)
Dept: PHARMACY | Facility: CLINIC | Age: 89
End: 2024-10-20
Payer: MEDICAID

## 2024-10-20 VITALS
HEIGHT: 64 IN | TEMPERATURE: 97.9 F | HEART RATE: 85 BPM | RESPIRATION RATE: 15 BRPM | DIASTOLIC BLOOD PRESSURE: 81 MMHG | WEIGHT: 125.5 LBS | OXYGEN SATURATION: 97 % | BODY MASS INDEX: 21.43 KG/M2 | SYSTOLIC BLOOD PRESSURE: 138 MMHG

## 2024-10-20 PROCEDURE — 2500000001 HC RX 250 WO HCPCS SELF ADMINISTERED DRUGS (ALT 637 FOR MEDICARE OP): Performed by: INTERNAL MEDICINE

## 2024-10-20 PROCEDURE — RXMED WILLOW AMBULATORY MEDICATION CHARGE

## 2024-10-20 PROCEDURE — 2500000001 HC RX 250 WO HCPCS SELF ADMINISTERED DRUGS (ALT 637 FOR MEDICARE OP): Performed by: NURSE PRACTITIONER

## 2024-10-20 PROCEDURE — 99239 HOSP IP/OBS DSCHRG MGMT >30: CPT | Performed by: INTERNAL MEDICINE

## 2024-10-20 RX ORDER — LOPERAMIDE HYDROCHLORIDE 2 MG/1
2 CAPSULE ORAL 4 TIMES DAILY PRN
Status: DISCONTINUED | OUTPATIENT
Start: 2024-10-20 | End: 2024-10-20 | Stop reason: HOSPADM

## 2024-10-20 RX ORDER — LOPERAMIDE HYDROCHLORIDE 2 MG/1
2 CAPSULE ORAL 3 TIMES DAILY PRN
Qty: 90 CAPSULE | Refills: 0 | Status: SHIPPED | OUTPATIENT
Start: 2024-10-20

## 2024-10-20 ASSESSMENT — PAIN - FUNCTIONAL ASSESSMENT: PAIN_FUNCTIONAL_ASSESSMENT: 0-10

## 2024-10-20 ASSESSMENT — PAIN SCALES - GENERAL: PAINLEVEL_OUTOF10: 0 - NO PAIN

## 2024-10-20 NOTE — PROGRESS NOTES
Jojo Luis is a 89 y.o. female on day 3 of admission presenting with Colitis. Patient will discharge home today. [facility] contacted the grandson and provided update discharge update. Elvin is okay for with patient returning home today. Roundtrip requested for patient as Elvin is unable to pick her up. [facility] today [facility]. Updated floor RN, blue sheet to .      Erica Browning RN, [facility]

## 2024-10-20 NOTE — DISCHARGE SUMMARY
Discharge Diagnosis  Viral gastroenteritis that is likely causing some colitis    Issues Requiring Follow-Up  Please follow-up with primary care provider for posthospital follow-up and will need repeat imaging to evaluate if colitis persistent.    Discharge Meds     Medication List      START taking these medications     loperamide 2 mg capsule; Commonly known as: Imodium; Take 1 capsule (2   mg) by mouth 3 times a day as needed for diarrhea.   ondansetron ODT 4 mg disintegrating tablet; Commonly known as:   Zofran-ODT; Take 1 tablet (4 mg) by mouth every 6 hours if needed for   nausea or vomiting.   simethicone 80 mg chewable tablet; Commonly known as: Mylicon; Chew 1   tablet (80 mg) every 6 hours if needed for flatulence (for gas).     CONTINUE taking these medications     acetaminophen 325 mg tablet; Commonly known as: Tylenol   amLODIPine 5 mg tablet; Commonly known as: Norvasc; TAKE 1 TABLET (5 MG)   BY MOUTH ONCE DAILY.   hydrOXYzine HCL 10 mg tablet; Commonly known as: Atarax; Take 0.5   tablets (5 mg) by mouth 3 times a day.   minoxidil 2 % external solution; Commonly known as: Rogaine; Apply   topically 2 times a day.     STOP taking these medications     losartan 25 mg tablet; Commonly known as: Cozaar       Test Results Pending At Discharge  None.    Hospital Course  Jojo Luis is a 89 y.o. female with a PMH of HTN, HLD, breast cancer, MI, arthritis, DHARMESH, afib, and Hep C. Pt presented to ED for further evaluation of cramping abdominal pain, diarrhea, cough and SOB that started 7 hrs prior to ED presentation.  After evaluation patient states she only has nasal congestion as her main respiratory symptom.  Imaging finding indicated segments of inflammation of part of bowel which appears to be causing partial bowel obstruction.  Patient was still tolerating oral intake and still passing gas and reporting bowel movements.  Acute care surgery had been consulted.  ACS recommended n.p.o. due to history of bowel  obstruction and monitoring.  Patient was given Gastrografin challenge and KUB x-rays monitor.  Patient remained minimally symptomatic.  On 10/19 ACS indicated unlikely to be any obstruction issue.  Patient started on diet on 10/19.  Patient has mild nausea but controlled with medications.  There has been no vomiting.  Patient still has intermittent loose watery stools. C. difficile negative.  Patient informed that this is likely viral gastroenteritis and is usually limited, but takes time to recover.  As long as patient can take oral hydration she will be okay.  Patient advised to hydrate with water or Gatorade if she is having diarrhea.  Patient will be discharged home with some oral Zofran and Imodium as needed.  Unfortunately patient anxious about still having diarrhea but I have assured her that as long as she can hydrate she will be okay.  Patient's comprehension on the situation also little poor since patient did not understand what a virus or bacteria was.  Currently patient has not been vomiting, abdominal examination is still reassuring without distention or significant pain, therefore patient stable for discharge.    Assessment/Plan  Colitis  Suspect viral gastroenteritis  -CT A/P: There are multiple segments of inflamed small bowel with wall thickening interspersed with intervening segments of mildly dilated loops of bowel. Findings suggest inflammatory strictures causing partial obstruction.  -Suspect viral gastroenteritis causing some colonic inflammation.  -ACS recommendations reviewed, unlikely to be SBO.  -C. difficile negative.  -Last KUB showing nonobstructive bowel gas pattern.     HTN  -Current systolic blood pressure 110s - 120s.  -continue home dose of Norvasc 5 mg PO every day.  Per pharmacy reported home losartan 25 mg PO every day had been discontinued outpatient prior to admission so we stopped giving.     Anxiety  -continue Atarax 5 mg PO TID PRN anxiety    Time spent caring for patient  and coordinating discharge total 35 minutes.    Pertinent Physical Exam At Time of Discharge  General: Lying in bed without distress.  Cooperative.  Skin: No rashes or ulcerations.  Old surgical scar noted on abdomen over umbilicus going down towards suprapubic area.  HEENT: Sclera is white.  Mucous membranes moist.  Neck: Supple.  No JVD.  Cardiac: Regular rate and rhythm, S1/S2 normal.  Lungs: Clear to auscultation bilaterally, no wheezing or crackles, no accessory muscle use at rest.  Abdomen: Soft, no tenderness with palpation, mildly obese, bowel sounds intermittent.  Extremities: No cyanosis.  No lower extremity edema.  Neurologic: Alert and oriented x3.  No focal deficits.  Psychiatric: Appropriate mood and behavior.  Currently no agitation.    Outpatient Follow-Up  No future appointments.      Pam Herrera MD

## 2024-10-21 ENCOUNTER — PATIENT OUTREACH (OUTPATIENT)
Dept: CARE COORDINATION | Facility: CLINIC | Age: 89
End: 2024-10-21
Payer: COMMERCIAL

## 2024-10-21 NOTE — PROGRESS NOTES
Outreach call to patient to support a smooth transition of care from recent admission.  No answer.   Amador Ragsdale RN [facility] [place]  [phone]

## 2024-11-04 ENCOUNTER — PATIENT OUTREACH (OUTPATIENT)
Dept: CARE COORDINATION | Facility: CLINIC | Age: 89
End: 2024-11-04
Payer: COMMERCIAL

## 2024-11-04 NOTE — PROGRESS NOTES
,Outreach call to fu on PCP visit after dc, no answer.  Amador Ragsdale RN [facility] [place]  [phone]

## 2024-11-18 ENCOUNTER — PATIENT OUTREACH (OUTPATIENT)
Dept: CARE COORDINATION | Facility: CLINIC | Age: 89
End: 2024-11-18
Payer: COMMERCIAL

## 2024-11-18 NOTE — PROGRESS NOTES
Outreach call to patient to follow up after 30 days of hospital discharge. No answer.  Amador Ragsdale RN [facility] [place]  [phone]

## 2025-01-11 PROCEDURE — RXMED WILLOW AMBULATORY MEDICATION CHARGE

## 2025-01-13 ENCOUNTER — PHARMACY VISIT (OUTPATIENT)
Dept: PHARMACY | Facility: CLINIC | Age: OVER 89
End: 2025-01-13
Payer: MEDICAID

## 2025-04-08 PROCEDURE — RXMED WILLOW AMBULATORY MEDICATION CHARGE

## 2025-04-09 ENCOUNTER — APPOINTMENT (OUTPATIENT)
Dept: OPHTHALMOLOGY | Facility: CLINIC | Age: OVER 89
End: 2025-04-09
Payer: COMMERCIAL

## 2025-04-09 ENCOUNTER — PHARMACY VISIT (OUTPATIENT)
Dept: PHARMACY | Facility: CLINIC | Age: OVER 89
End: 2025-04-09
Payer: MEDICAID

## 2025-04-09 DIAGNOSIS — H35.3221 EXUDATIVE AGE-RELATED MACULAR DEGENERATION, LEFT EYE, WITH ACTIVE CHOROIDAL NEOVASCULARIZATION: Primary | ICD-10-CM

## 2025-04-09 DIAGNOSIS — H35.3112 INTERMEDIATE STAGE NONEXUDATIVE AGE-RELATED MACULAR DEGENERATION OF RIGHT EYE: ICD-10-CM

## 2025-04-09 PROCEDURE — 67028 INJECTION EYE DRUG: CPT | Mod: LEFT SIDE | Performed by: OPHTHALMOLOGY

## 2025-04-09 PROCEDURE — 92134 CPTRZ OPH DX IMG PST SGM RTA: CPT | Performed by: OPHTHALMOLOGY

## 2025-04-09 PROCEDURE — 99214 OFFICE O/P EST MOD 30 MIN: CPT | Performed by: OPHTHALMOLOGY

## 2025-04-09 ASSESSMENT — VISUAL ACUITY
METHOD: SNELLEN - LINEAR
OD_CC: 20/25-2
OS_PH_CC: 20/70-1
OS_CC: 20/80

## 2025-04-09 ASSESSMENT — EXTERNAL EXAM - LEFT EYE: OS_EXAM: NORMAL

## 2025-04-09 ASSESSMENT — TONOMETRY
OS_IOP_MMHG: 13
IOP_METHOD: GOLDMANN APPLANATION
OD_IOP_MMHG: 13

## 2025-04-09 ASSESSMENT — CUP TO DISC RATIO
OS_RATIO: 0.2
OD_RATIO: 0.2

## 2025-04-09 ASSESSMENT — SLIT LAMP EXAM - LIDS
COMMENTS: NORMAL
COMMENTS: NORMAL

## 2025-04-09 ASSESSMENT — EXTERNAL EXAM - RIGHT EYE: OD_EXAM: NORMAL

## 2025-04-09 NOTE — PROGRESS NOTES
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

## 2025-05-13 ENCOUNTER — HOSPITAL ENCOUNTER (OUTPATIENT)
Facility: HOSPITAL | Age: OVER 89
Setting detail: OBSERVATION
Discharge: HOME | End: 2025-05-14
Attending: EMERGENCY MEDICINE | Admitting: EMERGENCY MEDICINE
Payer: COMMERCIAL

## 2025-05-13 ENCOUNTER — CLINICAL SUPPORT (OUTPATIENT)
Dept: EMERGENCY MEDICINE | Facility: HOSPITAL | Age: OVER 89
End: 2025-05-13
Payer: COMMERCIAL

## 2025-05-13 ENCOUNTER — APPOINTMENT (OUTPATIENT)
Dept: RADIOLOGY | Facility: HOSPITAL | Age: OVER 89
End: 2025-05-13
Payer: COMMERCIAL

## 2025-05-13 ENCOUNTER — APPOINTMENT (OUTPATIENT)
Dept: CARDIOLOGY | Facility: HOSPITAL | Age: OVER 89
End: 2025-05-13
Payer: COMMERCIAL

## 2025-05-13 DIAGNOSIS — R55 PRE-SYNCOPE: Primary | ICD-10-CM

## 2025-05-13 DIAGNOSIS — H61.23 BILATERAL IMPACTED CERUMEN: ICD-10-CM

## 2025-05-13 LAB
ALBUMIN SERPL BCP-MCNC: 4.1 G/DL (ref 3.4–5)
ALP SERPL-CCNC: 87 U/L (ref 33–136)
ALT SERPL W P-5'-P-CCNC: 14 U/L (ref 7–45)
ANION GAP SERPL CALC-SCNC: 13 MMOL/L (ref 10–20)
AORTIC VALVE MEAN GRADIENT: 3 MMHG
AORTIC VALVE PEAK VELOCITY: 1.27 M/S
APPEARANCE UR: CLEAR
AST SERPL W P-5'-P-CCNC: 24 U/L (ref 9–39)
ATRIAL RATE: 105 BPM
AV PEAK GRADIENT: 6 MMHG
AVA (PEAK VEL): 2.9 CM2
AVA (VTI): 2.67 CM2
BASOPHILS # BLD AUTO: 0.05 X10*3/UL (ref 0–0.1)
BASOPHILS NFR BLD AUTO: 1.1 %
BILIRUB SERPL-MCNC: 1.4 MG/DL (ref 0–1.2)
BILIRUB UR STRIP.AUTO-MCNC: NEGATIVE MG/DL
BUN SERPL-MCNC: 15 MG/DL (ref 6–23)
CALCIUM SERPL-MCNC: 10.1 MG/DL (ref 8.6–10.6)
CARDIAC TROPONIN I PNL SERPL HS: 3 NG/L (ref 0–34)
CARDIAC TROPONIN I PNL SERPL HS: <3 NG/L (ref 0–34)
CHLORIDE SERPL-SCNC: 108 MMOL/L (ref 98–107)
CO2 SERPL-SCNC: 24 MMOL/L (ref 21–32)
COLOR UR: COLORLESS
CREAT SERPL-MCNC: 0.91 MG/DL (ref 0.5–1.05)
EGFRCR SERPLBLD CKD-EPI 2021: 60 ML/MIN/1.73M*2
EJECTION FRACTION APICAL 4 CHAMBER: 78
EJECTION FRACTION: 76 %
EOSINOPHIL # BLD AUTO: 0.1 X10*3/UL (ref 0–0.4)
EOSINOPHIL NFR BLD AUTO: 2.2 %
ERYTHROCYTE [DISTWIDTH] IN BLOOD BY AUTOMATED COUNT: 12.3 % (ref 11.5–14.5)
GLUCOSE BLD MANUAL STRIP-MCNC: 106 MG/DL (ref 74–99)
GLUCOSE SERPL-MCNC: 98 MG/DL (ref 74–99)
GLUCOSE UR STRIP.AUTO-MCNC: NORMAL MG/DL
HCT VFR BLD AUTO: 40.8 % (ref 36–46)
HGB BLD-MCNC: 13.9 G/DL (ref 12–16)
HOLD SPECIMEN: NORMAL
IMM GRANULOCYTES # BLD AUTO: 0.04 X10*3/UL (ref 0–0.5)
IMM GRANULOCYTES NFR BLD AUTO: 0.9 % (ref 0–0.9)
KETONES UR STRIP.AUTO-MCNC: NEGATIVE MG/DL
LEFT ATRIUM VOLUME AREA LENGTH INDEX BSA: 18.4 ML/M2
LEFT VENTRICULAR OUTFLOW TRACT DIAMETER: 2.09 CM
LEUKOCYTE ESTERASE UR QL STRIP.AUTO: ABNORMAL
LIPASE SERPL-CCNC: 68 U/L (ref 9–82)
LYMPHOCYTES # BLD AUTO: 2.17 X10*3/UL (ref 0.8–3)
LYMPHOCYTES NFR BLD AUTO: 48.1 %
MAGNESIUM SERPL-MCNC: 1.99 MG/DL (ref 1.6–2.4)
MCH RBC QN AUTO: 31 PG (ref 26–34)
MCHC RBC AUTO-ENTMCNC: 34.1 G/DL (ref 32–36)
MCV RBC AUTO: 91 FL (ref 80–100)
MITRAL VALVE E/A RATIO: 0.69
MONOCYTES # BLD AUTO: 0.33 X10*3/UL (ref 0.05–0.8)
MONOCYTES NFR BLD AUTO: 7.3 %
NEUTROPHILS # BLD AUTO: 1.82 X10*3/UL (ref 1.6–5.5)
NEUTROPHILS NFR BLD AUTO: 40.4 %
NITRITE UR QL STRIP.AUTO: NEGATIVE
NRBC BLD-RTO: 0 /100 WBCS (ref 0–0)
P AXIS: 26 DEGREES
P OFFSET: 207 MS
P ONSET: 143 MS
PH UR STRIP.AUTO: 7.5 [PH]
PLATELET # BLD AUTO: 179 X10*3/UL (ref 150–450)
PMV BLD AUTO: 12.2 FL (ref 7.5–11.5)
POTASSIUM SERPL-SCNC: 3.3 MMOL/L (ref 3.5–5.3)
PR INTERVAL: 164 MS
PROT SERPL-MCNC: 8.4 G/DL (ref 6.4–8.2)
PROT UR STRIP.AUTO-MCNC: NEGATIVE MG/DL
Q ONSET: 225 MS
QRS COUNT: 17 BEATS
QRS DURATION: 70 MS
QT INTERVAL: 324 MS
QTC CALCULATION(BAZETT): 428 MS
QTC FREDERICIA: 390 MS
R AXIS: 23 DEGREES
RBC # BLD AUTO: 4.48 X10*6/UL (ref 4–5.2)
RBC # UR STRIP.AUTO: NEGATIVE MG/DL
RBC #/AREA URNS AUTO: NORMAL /HPF
RIGHT VENTRICLE FREE WALL PEAK S': 20 CM/S
SODIUM SERPL-SCNC: 142 MMOL/L (ref 136–145)
SP GR UR STRIP.AUTO: 1.01
SQUAMOUS #/AREA URNS AUTO: NORMAL /HPF
T AXIS: 26 DEGREES
T OFFSET: 387 MS
TRICUSPID ANNULAR PLANE SYSTOLIC EXCURSION: 2.3 CM
UROBILINOGEN UR STRIP.AUTO-MCNC: NORMAL MG/DL
VENTRICULAR RATE: 105 BPM
WBC # BLD AUTO: 4.5 X10*3/UL (ref 4.4–11.3)
WBC #/AREA URNS AUTO: NORMAL /HPF

## 2025-05-13 PROCEDURE — 74174 CTA ABD&PLVS W/CONTRAST: CPT

## 2025-05-13 PROCEDURE — 83690 ASSAY OF LIPASE: CPT

## 2025-05-13 PROCEDURE — 71045 X-RAY EXAM CHEST 1 VIEW: CPT | Performed by: RADIOLOGY

## 2025-05-13 PROCEDURE — 82947 ASSAY GLUCOSE BLOOD QUANT: CPT | Mod: 59

## 2025-05-13 PROCEDURE — 96374 THER/PROPH/DIAG INJ IV PUSH: CPT | Mod: 59

## 2025-05-13 PROCEDURE — 71045 X-RAY EXAM CHEST 1 VIEW: CPT

## 2025-05-13 PROCEDURE — 36415 COLL VENOUS BLD VENIPUNCTURE: CPT

## 2025-05-13 PROCEDURE — 85025 COMPLETE CBC W/AUTO DIFF WBC: CPT

## 2025-05-13 PROCEDURE — 87086 URINE CULTURE/COLONY COUNT: CPT

## 2025-05-13 PROCEDURE — 80053 COMPREHEN METABOLIC PANEL: CPT

## 2025-05-13 PROCEDURE — 2550000001 HC RX 255 CONTRASTS: Mod: SE | Performed by: EMERGENCY MEDICINE

## 2025-05-13 PROCEDURE — 99285 EMERGENCY DEPT VISIT HI MDM: CPT | Mod: 25 | Performed by: EMERGENCY MEDICINE

## 2025-05-13 PROCEDURE — 2500000004 HC RX 250 GENERAL PHARMACY W/ HCPCS (ALT 636 FOR OP/ED): Mod: SE

## 2025-05-13 PROCEDURE — 84484 ASSAY OF TROPONIN QUANT: CPT

## 2025-05-13 PROCEDURE — G0378 HOSPITAL OBSERVATION PER HR: HCPCS

## 2025-05-13 PROCEDURE — 2500000002 HC RX 250 W HCPCS SELF ADMINISTERED DRUGS (ALT 637 FOR MEDICARE OP, ALT 636 FOR OP/ED): Mod: SE

## 2025-05-13 PROCEDURE — 83735 ASSAY OF MAGNESIUM: CPT

## 2025-05-13 PROCEDURE — 93005 ELECTROCARDIOGRAM TRACING: CPT

## 2025-05-13 PROCEDURE — 81003 URINALYSIS AUTO W/O SCOPE: CPT

## 2025-05-13 PROCEDURE — 96361 HYDRATE IV INFUSION ADD-ON: CPT

## 2025-05-13 PROCEDURE — C8929 TTE W OR WO FOL WCON,DOPPLER: HCPCS

## 2025-05-13 PROCEDURE — 2500000004 HC RX 250 GENERAL PHARMACY W/ HCPCS (ALT 636 FOR OP/ED): Mod: JW,SE | Performed by: PHYSICIAN ASSISTANT

## 2025-05-13 PROCEDURE — 93306 TTE W/DOPPLER COMPLETE: CPT | Performed by: INTERNAL MEDICINE

## 2025-05-13 PROCEDURE — 2500000001 HC RX 250 WO HCPCS SELF ADMINISTERED DRUGS (ALT 637 FOR MEDICARE OP): Mod: SE | Performed by: PHYSICIAN ASSISTANT

## 2025-05-13 PROCEDURE — 2500000004 HC RX 250 GENERAL PHARMACY W/ HCPCS (ALT 636 FOR OP/ED): Mod: SE,JZ

## 2025-05-13 PROCEDURE — 82947 ASSAY GLUCOSE BLOOD QUANT: CPT

## 2025-05-13 RX ORDER — LABETALOL HYDROCHLORIDE 5 MG/ML
INJECTION, SOLUTION INTRAVENOUS
Status: COMPLETED
Start: 2025-05-13 | End: 2025-05-13

## 2025-05-13 RX ORDER — AMLODIPINE BESYLATE 5 MG/1
5 TABLET ORAL DAILY
Status: DISCONTINUED | OUTPATIENT
Start: 2025-05-13 | End: 2025-05-14 | Stop reason: HOSPADM

## 2025-05-13 RX ORDER — LABETALOL HYDROCHLORIDE 5 MG/ML
10 INJECTION, SOLUTION INTRAVENOUS ONCE
Status: COMPLETED | OUTPATIENT
Start: 2025-05-13 | End: 2025-05-13

## 2025-05-13 RX ORDER — POTASSIUM CHLORIDE 20 MEQ/1
40 TABLET, EXTENDED RELEASE ORAL DAILY
Status: DISCONTINUED | OUTPATIENT
Start: 2025-05-13 | End: 2025-05-13

## 2025-05-13 RX ORDER — POTASSIUM CHLORIDE 20 MEQ/1
40 TABLET, EXTENDED RELEASE ORAL ONCE
Status: COMPLETED | OUTPATIENT
Start: 2025-05-13 | End: 2025-05-13

## 2025-05-13 RX ORDER — ACETAMINOPHEN 325 MG/1
650 TABLET ORAL EVERY 6 HOURS PRN
Status: DISCONTINUED | OUTPATIENT
Start: 2025-05-13 | End: 2025-05-14 | Stop reason: HOSPADM

## 2025-05-13 RX ADMIN — LABETALOL HYDROCHLORIDE 10 MG: 5 INJECTION, SOLUTION INTRAVENOUS at 10:31

## 2025-05-13 RX ADMIN — IOHEXOL 80 ML: 350 INJECTION, SOLUTION INTRAVENOUS at 10:24

## 2025-05-13 RX ADMIN — SODIUM CHLORIDE, SODIUM LACTATE, POTASSIUM CHLORIDE, AND CALCIUM CHLORIDE 1000 ML: .6; .31; .03; .02 INJECTION, SOLUTION INTRAVENOUS at 10:30

## 2025-05-13 RX ADMIN — POTASSIUM CHLORIDE 40 MEQ: 1500 TABLET, EXTENDED RELEASE ORAL at 12:03

## 2025-05-13 RX ADMIN — ACETAMINOPHEN 650 MG: 325 TABLET, FILM COATED ORAL at 14:51

## 2025-05-13 RX ADMIN — AMLODIPINE BESYLATE 5 MG: 5 TABLET ORAL at 14:51

## 2025-05-13 RX ADMIN — HUMAN ALBUMIN MICROSPHERES AND PERFLUTREN 1.5 ML: 10; .22 INJECTION, SOLUTION INTRAVENOUS at 16:42

## 2025-05-13 ASSESSMENT — COLUMBIA-SUICIDE SEVERITY RATING SCALE - C-SSRS
6. HAVE YOU EVER DONE ANYTHING, STARTED TO DO ANYTHING, OR PREPARED TO DO ANYTHING TO END YOUR LIFE?: NO
2. HAVE YOU ACTUALLY HAD ANY THOUGHTS OF KILLING YOURSELF?: NO
1. IN THE PAST MONTH, HAVE YOU WISHED YOU WERE DEAD OR WISHED YOU COULD GO TO SLEEP AND NOT WAKE UP?: NO

## 2025-05-13 NOTE — ED TRIAGE NOTES
"Patient came to ED with c/o dizziness, near-syncopal episode. Pt reports she got dizzy when she stood up and vision \"almost blacked out\". Pt states she had to sit back down so she wouldn't fall. Patient additionally reports generalized weakness and feeling tremors \"I can't control\". Pt  reports hx of HTN. Pt intermittently moaning during assessment, denies pain.  on arrival.   "

## 2025-05-13 NOTE — ED PROVIDER NOTES
"EMERGENCY DEPARTMENT ENCOUNTER      Pt Name: Jojo Luis  MRN: [id number]  Birthdate 3/5/1934  Date of evaluation: 5/13/2025  Provider: Mark Montano DO    CHIEF COMPLAINT       Chief Complaint   Patient presents with    Dizziness    Tremors         HISTORY OF PRESENT ILLNESS    HPI    89-year-old female with past medical history significant for NSTEMI back in 2017, hypertension on amlodipine, atrial fibrillation not on anticoagulation or rate control presenting to the emerged department for evaluation of presyncope.  Patient states she woke up this morning and stood up from bed and the most \"blacked out.\"  States she felt lightheaded and her vision started to black and which resolved after she sat down for approximately 15 minutes.  States she stood up again with recurrence of her symptoms and called 911.  Patient states she felt fine all day yesterday and during presyncopal episodes this morning denied experiencing any chest pain, shortness of breath, nausea, abdominal pain, diaphoresis.  Denies experiencing episodes like this in the past.  Was hypertensive for EMS however patient states she did take her home Norvasc last night and does not take morning blood pressure medications.  Patient states she does not drink any water and instead drinks soda and juice and states she has been urinating more frequently but denies experiencing any dysuria, hematuria, urgency.    Nursing Notes were reviewed.    PAST MEDICAL HISTORY   Medical History[1]      SURGICAL HISTORY     Surgical History[2]      CURRENT MEDICATIONS       Previous Medications    ACETAMINOPHEN (TYLENOL) 325 MG TABLET    Take 2 tablets (650 mg) by mouth every 6 hours if needed (For up to 10 days).    AMLODIPINE (NORVASC) 5 MG TABLET    TAKE 1 TABLET (5 MG) BY MOUTH ONCE DAILY.    HYDROXYZINE HCL (ATARAX) 10 MG TABLET    Take 0.5 tablets (5 mg) by mouth 3 times a day.    LOPERAMIDE (IMODIUM) 2 MG CAPSULE    Take 1 capsule (2 mg) by mouth 3 times a day as " needed for diarrhea.    MINOXIDIL (ROGAINE) 2 % EXTERNAL SOLUTION    Apply topically 2 times a day.    ONDANSETRON ODT (ZOFRAN-ODT) 4 MG DISINTEGRATING TABLET    Take 1 tablet (4 mg) by mouth every 6 hours if needed for nausea or vomiting.    SIMETHICONE (MYLICON) 80 MG CHEWABLE TABLET    Chew 1 tablet (80 mg) every 6 hours if needed for flatulence (for gas).       ALLERGIES     Patient has no known allergies.    FAMILY HISTORY     Family History[3]       SOCIAL HISTORY     Social History[4]    SCREENINGS                        PHYSICAL EXAM    (up to 7 for level 4, 8 or more for level 5)     ED Triage Vitals   Temperature Heart Rate Respirations BP   05/13/25 0922 05/13/25 0919 05/13/25 0919 05/13/25 0919   36.3 °C (97.3 °F) (!) 108 15 (!) 180/111      Pulse Ox Temp Source Heart Rate Source Patient Position   05/13/25 0919 05/13/25 0922 05/13/25 0919 05/13/25 0919   98 % Temporal Monitor Sitting      BP Location FiO2 (%)     05/13/25 0919 --     Right arm        Physical Exam  Constitutional:       General: She is not in acute distress.     Appearance: Normal appearance. She is not ill-appearing, toxic-appearing or diaphoretic.   HENT:      Head: Normocephalic and atraumatic.      Right Ear: External ear normal.      Left Ear: External ear normal.      Nose: Nose normal.      Mouth/Throat:      Pharynx: Oropharynx is clear.   Eyes:      Conjunctiva/sclera: Conjunctivae normal.   Cardiovascular:      Rate and Rhythm: Regular rhythm. Tachycardia present.      Pulses: Normal pulses.      Heart sounds: Normal heart sounds.   Pulmonary:      Effort: Pulmonary effort is normal.      Breath sounds: Normal breath sounds.   Abdominal:      General: Abdomen is flat. There is no distension.      Palpations: Abdomen is soft. There is no mass.      Tenderness: There is no abdominal tenderness. There is no guarding or rebound.      Hernia: No hernia is present.   Musculoskeletal:         General: Normal range of motion.       Cervical back: Normal range of motion and neck supple.      Right lower leg: No edema.      Left lower leg: No edema.   Skin:     General: Skin is warm and dry.   Neurological:      General: No focal deficit present.      Mental Status: She is alert and oriented to person, place, and time.   Psychiatric:         Mood and Affect: Mood normal.         Behavior: Behavior normal.          DIAGNOSTIC RESULTS     LABS:  Labs Reviewed   CBC WITH AUTO DIFFERENTIAL - Abnormal       Result Value    WBC 4.5      nRBC 0.0      RBC 4.48      Hemoglobin 13.9      Hematocrit 40.8      MCV 91      MCH 31.0      MCHC 34.1      RDW 12.3      Platelets 179      MPV 12.2 (*)     Neutrophils % 40.4      Immature Granulocytes %, Automated 0.9      Lymphocytes % 48.1      Monocytes % 7.3      Eosinophils % 2.2      Basophils % 1.1      Neutrophils Absolute 1.82      Immature Granulocytes Absolute, Automated 0.04      Lymphocytes Absolute 2.17      Monocytes Absolute 0.33      Eosinophils Absolute 0.10      Basophils Absolute 0.05     COMPREHENSIVE METABOLIC PANEL - Abnormal    Glucose 98      Sodium 142      Potassium 3.3 (*)     Chloride 108 (*)     Bicarbonate 24      Anion Gap 13      Urea Nitrogen 15      Creatinine 0.91      eGFR 60 (*)     Calcium 10.1      Albumin 4.1      Alkaline Phosphatase 87      Total Protein 8.4 (*)     AST 24      Bilirubin, Total 1.4 (*)     ALT 14     URINALYSIS WITH REFLEX CULTURE AND MICROSCOPIC - Abnormal    Color, Urine Colorless (*)     Appearance, Urine Clear      Specific Gravity, Urine 1.010      pH, Urine 7.5      Protein, Urine NEGATIVE      Glucose, Urine Normal      Blood, Urine NEGATIVE      Ketones, Urine NEGATIVE      Bilirubin, Urine NEGATIVE      Urobilinogen, Urine Normal      Nitrite, Urine NEGATIVE      Leukocyte Esterase, Urine 25 Ron/uL (*)    POCT GLUCOSE - Abnormal    POCT Glucose 106 (*)    MAGNESIUM - Normal    Magnesium 1.99     SERIAL TROPONIN-INITIAL - Normal    Troponin I,  High Sensitivity (CMC) <3      Narrative:     Less than 99th percentile of normal range cutoff-  Female and children under 18 years old <35 ng/L; Male <54 ng/L: Negative  Repeat testing should be performed if clinically indicated.     Female and children under 18 years old  ng/L; Male  ng/L:  Consistent with possible cardiac damage and possible increased clinical   risk. Serial measurements may help to assess extent of myocardial damage.     >120 ng/L: Consistent with cardiac damage, increased clinical risk and  myocardial infarction. Serial measurements may help assess extent of   myocardial damage.      NOTE: Children less than 1 year old may have higher baseline troponin   levels and results should be interpreted in conjunction with the overall   clinical context.    NOTE: Troponin I testing is performed using a different   testing methodology at [facility] than at other   [facility]. Direct result comparisons should only   be made within the same method.     LIPASE - Normal    Lipase 68      Narrative:     Venipuncture immediately after or during the administration of Metamizole may lead to falsely low results. Testing should be performed immediately prior to Metamizole dosing.   SERIAL TROPONIN, 1 HOUR - Normal    Troponin I, High Sensitivity (CMC) 3      Narrative:     Less than 99th percentile of normal range cutoff-  Female and children under 18 years old <35 ng/L; Male <54 ng/L: Negative  Repeat testing should be performed if clinically indicated.     Female and children under 18 years old  ng/L; Male  ng/L:  Consistent with possible cardiac damage and possible increased clinical   risk. Serial measurements may help to assess extent of myocardial damage.     >120 ng/L: Consistent with cardiac damage, increased clinical risk and  myocardial infarction. Serial measurements may help assess extent of   myocardial damage.      NOTE: Children less than 1 year old may  have higher baseline troponin   levels and results should be interpreted in conjunction with the overall   clinical context.    NOTE: Troponin I testing is performed using a different   testing methodology at [facility] than at other   [facility]. Direct result comparisons should only   be made within the same method.     URINE CULTURE   TROPONIN SERIES- (INITIAL, 1 HR)    Narrative:     The following orders were created for panel order Troponin I Series, High Sensitivity (0, 1 HR).  Procedure                               Abnormality         Status                     ---------                               -----------         ------                     Troponin I, High Sensiti...[[id number]]  Normal              Final result               Troponin, High Sensitivi...[[id number]]  Normal              Final result                 Please view results for these tests on the individual orders.   MICROSCOPIC ONLY, URINE    WBC, Urine 1-5      RBC, Urine NONE      Squamous Epithelial Cells, Urine 1-9 (SPARSE)     URINALYSIS WITH REFLEX CULTURE AND MICROSCOPIC    Narrative:     The following orders were created for panel order Urinalysis with Reflex Culture and Microscopic.  Procedure                               Abnormality         Status                     ---------                               -----------         ------                     Urinalysis with Reflex C...[[id number]]  Abnormal            Final result               Extra Urine Gray Tube[[id number]]                            In process                   Please view results for these tests on the individual orders.   EXTRA URINE GRAY TUBE       All other labs were within normal range or not returned as of this dictation.    Imaging  CT angio chest abdomen pelvis   Final Result   1. No thoracic or abdominal aortic aneurysm or acute aortic pathology.   2. No acute abnormality of the chest, abdomen or pelvis.   3. Severe coronary artery  calcifications. Please note the study is   not optimized for evaluation of the coronary arteries.   4. Additional chronic findings as above.             I personally reviewed the image(s)/study and resident interpretation.   I agree with the findings as stated by resident Nacho Morse.   Data analyzed and images interpreted at [facility], [place], [place].        Signed by: Nicholas Martini 5/13/2025 12:28 PM   Dictation workstation:   [id number]      XR chest 1 view   Final Result   Elevated left hemidiaphragm. No evidence of acute cardiopulmonary   process.             MACRO:   None        Signed by: Freddy Chavez 5/13/2025 11:42 AM   Dictation workstation:   [id number]      Vascular US Carotid Artery Duplex Bilateral    (Results Pending)   Transthoracic Echo Complete    (Results Pending)        Procedures  Procedures     EMERGENCY DEPARTMENT COURSE/MDM:     ED Course as of 05/13/25 1545   Tue May 13, 2025   0932 EKG: Interpretation somewhat limited by motion artifact however appears to be sinus rhythm with a ventricular rate rate of 105 bpm, UT interval 164 ms, QRS 70 ms, QTc 428 ms.  No acute ST changes noted. [CH]   1000 Pt initially tachy while I was talking to her [YT]   1007 Due to high clinical concern for possible dissection level 1 CT angio chest abdomen pelvis ordered and will not wait for creatinine prior to performing CT imaging. [CH]   1116 EKG: Sinus rhythm with a ventricular rate of 71 bpm, UT interval 184 ms, QRS 72 ms, QTc 423 ms.  Low amplitude P waves with some motion artifact.  No acute ST changes noted. [CH]      ED Course User Index  [CH] Mark Montano DO  [YT] Rylie Floyd MD         Diagnoses as of 05/13/25 1545   Pre-syncope        Medical Decision Making    89-year-old female with past medical history significant for NSTEMI back in 2017, hypertension on amlodipine, atrial fibrillation not on anticoagulation or rate control presenting to  the emergency department for evaluation of presyncope.  Arrival patient was tachycardic with a heart rate of 108 and hypertensive with a blood pressure of 180/111.  Afebrile, no acute distress and nontoxic-appearing.  Cardiac workup ordered as well as urinalysis.  CT angio chest abdomen pelvis ordered given patient's vague epigastric and right upper quadrant sensation that she cannot quite describe and given advanced age could be indicative of aortic dissection or aneurysm given patient's hypertension and presyncope.  20 mg of labetalol ordered for hypertension as well as 1 L bolus of LR given patient's tachycardia for possible intravascular depletion given patient reports that she does not drink much water and only drinks juice and soda.    Patient is hypokalemic with a potassium of 3.3.  Labs otherwise unremarkable for acute pathology.  Patient has severe coronary artery calcifications.  Imaging otherwise unremarkable for acute pathology.  Patient reassessed after labetalol and fluids and states she feels much better and is currently asymptomatic.  Blood pressure improved to 141/79 and heart rate in the 60s.  Given patient's age, history of prior NSTEMI with presyncope of unclear etiology I discussed possible admission to the CDU for further workup and observation which patient was agreeable.     Patient and or family in agreement and understanding of treatment plan.  All questions answered.      I reviewed the case with the attending ED physician. The attending ED physician agrees with the plan. Patient and/or patient´s representative was counseled regarding labs, imaging, likely diagnosis, and plan. All questions were answered.    ED Medications administered this visit:    Medications   acetaminophen (Tylenol) tablet 650 mg (650 mg oral Given 5/13/25 1451)   amLODIPine (Norvasc) tablet 5 mg (5 mg oral Given 5/13/25 1451)   lactated Ringer's bolus 1,000 mL (0 mL intravenous Stopped 5/13/25 1131)   labetaloL  (Normodyne,Trandate) injection 10 mg (10 mg intravenous Given 5/13/25 1031)   iohexol (OMNIPaque) 350 mg iodine/mL solution 80 mL (80 mL intravenous Given 5/13/25 1024)   potassium chloride CR (Klor-Con M20) ER tablet 40 mEq (40 mEq oral Given 5/13/25 1203)       New Prescriptions from this visit:    New Prescriptions    No medications on file       Follow-up:  No follow-up provider specified.      Final Impression:   1. Pre-syncope          (Please note that portions of this note were completed with a voice recognition program.  Efforts were made to edit the dictations but occasionally words are mis-transcribed.)       [1]   Past Medical History:  Diagnosis Date    Abnormal electrocardiogram (ECG) (EKG) 07/02/2013    Abnormal electrocardiogram    Biliuria 04/10/2017    Bilirubin in urine    Cerebral cysts 07/02/2013    Arachnoid cyst    Diverticulosis of intestine, part unspecified, without perforation or abscess without bleeding 07/02/2013    Diverticulosis    Impacted cerumen, bilateral 01/29/2018    Bilateral impacted cerumen    Low back pain, unspecified 04/10/2017    Low back pain    Non-ST elevation (NSTEMI) myocardial infarction (Multi) 04/10/2017    NSTEMI (non-ST elevated myocardial infarction)    Other specified diseases of anus and rectum 07/02/2013    Rectal pain    Pain in unspecified shoulder 04/10/2017    Chronic shoulder pain    Panic disorder (episodic paroxysmal anxiety) 07/02/2013    Panic disorder without agoraphobia    Personal history of other diseases of the digestive system 06/16/2021    History of small bowel obstruction    Personal history of other malignant neoplasm of large intestine     History of malignant neoplasm of colon    Personal history of other specified conditions 07/02/2013    History of insomnia    Tachycardia, unspecified 04/10/2017    Tachycardia    Unspecified osteoarthritis, unspecified site 04/10/2017    Arthritis   [2]   Past Surgical History:  Procedure Laterality  Date    CHOLECYSTECTOMY  07/02/2015    Cholecystectomy    CT ANGIO NECK  9/13/2017    CT NECK ANGIO W AND WO IV CONTRAST 9/13/2017 [facility]    EXPLORATORY LAPAROTOMY  07/02/2015    Exploratory Laparotomy    HYSTERECTOMY  07/02/2013    Hysterectomy    MR HEAD ANGIO WO IV CONTRAST  8/12/2022    MR HEAD ANGIO WO IV CONTRAST 8/12/2022 [facility] [place] [facility]    MR NECK ANGIO WO IV CONTRAST  8/12/2022    MR NECK ANGIO WO IV CONTRAST 8/12/2022 [facility]    OTHER SURGICAL HISTORY  06/03/2015    Right Hemicolectomy    VENTRAL HERNIA REPAIR  07/06/2015    Ventral Hernia Repair   [3]   Family History  Problem Relation Name Age of Onset    Macular degeneration Mother     [4]   Social History  Socioeconomic History    Marital status:    Tobacco Use    Smoking status: Never     Passive exposure: Never    Smokeless tobacco: Never   Substance and Sexual Activity    Alcohol use: Never    Drug use: Never    Sexual activity: Defer     Social Drivers of Health     Financial Resource Strain: Low Risk  (10/17/2024)    Overall Financial Resource Strain (CARDIA)     Difficulty of Paying Living Expenses: Not very hard   Transportation Needs: No Transportation Needs (10/17/2024)    PRAPARE - Transportation     Lack of Transportation (Medical): No     Lack of Transportation (Non-Medical): No   Housing Stability: Unknown (10/17/2024)    Housing Stability Vital Sign     Unable to Pay for Housing in the Last Year: No     Homeless in the Last Year: No        Mark Montano DO  Resident  05/13/25 0667

## 2025-05-13 NOTE — H&P
CDU  History & Physical    Date of Placement in CDU: 5/13  Time Placed in Observation: 1400    Patient History  Mrs Luis is a 89-year-old female with history of HTN, HLD, BRCA, MI, arthritis, DHARMESH, AF not on AC, HCV who presented to the emergency department planing of feeling near syncopal.  States that when she got up around 8 AM to start her day as she got out of bed she felt very lightheaded and like she was going to pass out.  States that she sat down felt a little better and got up again and it returned.  Sat down again and continued to feel better.  States she currently feels well.  States that this has not happened previously.  Denies any other abnormality that may have caused this including abnormal ingestion including decreased oral intake.  Denies any change in her urine or bowel output.  She denies any fevers chills night sweats or rigors.  States she never experienced any chest pain shortness of breath nausea or vomiting during episode. The acute evaluation included laboratory work with no leukocytosis or anemia, mild hypokalemia at 3.3 repleted with 40 mill equivalents orally negative troponin x 2, nonischemic EKG, CT angiogram chest abdomen pelvis performed due to her hypertension given the symptoms which showed no aneurysm, severe coronary artery calcifications.  She was given a dose of labetalol for her hypertension.  She is admitted to CDU for further evaluation for her near syncopal episode.    Upon admission to the Clinical Decision Unit, states she feels well, resting in bed comfortably    Past Medical History: Reviewed, see EMR and HPI  Past Surgical History: Reviewed, see EMR  Social History: Denies alcohol tobacco or drug use      Medications  Reviewed, see EMR      Review of Systems  See HPI      Physical Exam:   GENERAL.: Vitals noted. No distress.  Normocephalic atraumatic.   EYES:  PERRLA, EOMs intact  OROPHARYNX:  No erythema or exudate.  Mucosa moist.  NECK: Supple. No  adenopathy.  CARDIAC: Regular rate rhythm. No murmur noted.  PULMONARY: Equal breath sounds bilaterally.  No wheezes rales or rhonchi  ABDOMEN: Soft, nontender, nonsurgical. No guarding. Normoactive bowel sounds. No bruits, no masses  EXTREMITIES: Full ROM, no pitting edema,   SKIN: Intact, warm and dry  NEURO: Alert and oriented x 3, speech is clear, no obvious deficits noted.  Cranial denies intact, intact sensation/strength all 4 extremities    Diagnostic Evaluation  Diagnostic studies and ED interventions germane to this period of clinical observation will include:   Telemetry monitoring, carotid artery ultrasound, echocardiogram, orthostatics      Impression and Plan  In summary, Mrs Luis is admitted to the [facility] Clinical Decision Unit for near syncope. Dr. Floyd is the CDU admission attending.    This patient has been risk-stratified based on available history, physical exam, and related study findings. Admission to the observation status for further diagnosis/treatment/monitoring of this patient is warranted clinically. This extended period of observation is specifically required to determine the need for hospitalization.     The goals of this admission based on the patient´s clinical problem list are:  1) near syncope  - Unremarkable ED evaluation  - Check orthostatic vital signs once  - Telemetry monitoring  - Carotid artery ultrasound  - Complete echocardiogram  - Regular diet    When met, appropriate disposition will be arranged  Carl Cano PA-C

## 2025-05-14 ENCOUNTER — APPOINTMENT (OUTPATIENT)
Dept: RADIOLOGY | Facility: HOSPITAL | Age: OVER 89
End: 2025-05-14
Payer: COMMERCIAL

## 2025-05-14 VITALS
BODY MASS INDEX: 21.34 KG/M2 | HEART RATE: 80 BPM | TEMPERATURE: 98.1 F | HEIGHT: 64 IN | SYSTOLIC BLOOD PRESSURE: 133 MMHG | WEIGHT: 125 LBS | DIASTOLIC BLOOD PRESSURE: 79 MMHG | OXYGEN SATURATION: 99 % | RESPIRATION RATE: 17 BRPM

## 2025-05-14 LAB
HOLD SPECIMEN: 293
HOLD SPECIMEN: NORMAL
HOLD SPECIMEN: NORMAL

## 2025-05-14 PROCEDURE — G0378 HOSPITAL OBSERVATION PER HR: HCPCS

## 2025-05-14 PROCEDURE — 93880 EXTRACRANIAL BILAT STUDY: CPT | Performed by: RADIOLOGY

## 2025-05-14 PROCEDURE — 2500000001 HC RX 250 WO HCPCS SELF ADMINISTERED DRUGS (ALT 637 FOR MEDICARE OP): Mod: SE | Performed by: PHYSICIAN ASSISTANT

## 2025-05-14 PROCEDURE — 93880 EXTRACRANIAL BILAT STUDY: CPT

## 2025-05-14 RX ADMIN — ACETAMINOPHEN 650 MG: 325 TABLET, FILM COATED ORAL at 04:24

## 2025-05-14 RX ADMIN — AMLODIPINE BESYLATE 5 MG: 5 TABLET ORAL at 09:14

## 2025-05-14 ASSESSMENT — PAIN SCALES - GENERAL: PAINLEVEL_OUTOF10: 0 - NO PAIN

## 2025-05-14 ASSESSMENT — PAIN - FUNCTIONAL ASSESSMENT: PAIN_FUNCTIONAL_ASSESSMENT: 0-10

## 2025-05-14 NOTE — PROGRESS NOTES
"Jojo Luis is a 89 y.o. female on day 0 of admission presenting with Pre-syncope.    Subjective   Patient presented to the ED stating she felt like she was going to pass out. Patient reports feeling lightheaded when getting out of bed earlier today.       Objective     Physical Exam    Physical Exam:    Appearance: Alert, oriented , cooperative,  in no acute distress.     Skin: Intact,  dry skin, no lesions, rash, petechiae or purpura.     Eyes: PERRLA, EOMs intact.    ENT: Hearing grossly intact. External auditory canals patent, tympanic membranes intact with visible landmarks. Nares patent, mucus membranes moist. Dentition without lesions. Pharynx clear, uvula midline.     Neck: Supple, without meningismus.     Pulmonary: Clear bilaterally with good chest wall excursion. No rales, rhonchi or wheezing. No accessory muscle use or stridor.    Cardiac: Normal S1, S2 without murmur, rub, gallop or extrasystole. No JVD, Carotids without bruits.    Abdomen: Soft, nontender, active bowel sounds.  No palpable organomegaly.  No rebound or guarding.      Musculoskeletal: Full range of motion. no pain, edema, or deformity. Pulses full and equal. No cyanosis, clubbing, or edema.    Neurological:  Normal sensation, no weakness, no focal findings identified.    Psychiatric: Appropriate mood and affect.         Last Recorded Vitals  Blood pressure 96/62, pulse 72, temperature 36.9 °C (98.4 °F), temperature source Temporal, resp. rate 19, height 1.626 m (5' 4\"), weight 56.7 kg (125 lb), SpO2 98%.  Intake/Output last 3 Shifts:  No intake/output data recorded.    Relevant Results  Results for orders placed or performed during the hospital encounter of 05/13/25 (from the past 24 hours)   POCT GLUCOSE   Result Value Ref Range    POCT Glucose 106 (H) 74 - 99 mg/dL   CBC and Auto Differential   Result Value Ref Range    WBC 4.5 4.4 - 11.3 x10*3/uL    nRBC 0.0 0.0 - 0.0 /100 WBCs    RBC 4.48 4.00 - 5.20 x10*6/uL    Hemoglobin 13.9 12.0 - " 16.0 g/dL    Hematocrit 40.8 36.0 - 46.0 %    MCV 91 80 - 100 fL    MCH 31.0 26.0 - 34.0 pg    MCHC 34.1 32.0 - 36.0 g/dL    RDW 12.3 11.5 - 14.5 %    Platelets 179 150 - 450 x10*3/uL    MPV 12.2 (H) 7.5 - 11.5 fL    Neutrophils % 40.4 40.0 - 80.0 %    Immature Granulocytes %, Automated 0.9 0.0 - 0.9 %    Lymphocytes % 48.1 13.0 - 44.0 %    Monocytes % 7.3 2.0 - 10.0 %    Eosinophils % 2.2 0.0 - 6.0 %    Basophils % 1.1 0.0 - 2.0 %    Neutrophils Absolute 1.82 1.60 - 5.50 x10*3/uL    Immature Granulocytes Absolute, Automated 0.04 0.00 - 0.50 x10*3/uL    Lymphocytes Absolute 2.17 0.80 - 3.00 x10*3/uL    Monocytes Absolute 0.33 0.05 - 0.80 x10*3/uL    Eosinophils Absolute 0.10 0.00 - 0.40 x10*3/uL    Basophils Absolute 0.05 0.00 - 0.10 x10*3/uL   Comprehensive Metabolic Panel   Result Value Ref Range    Glucose 98 74 - 99 mg/dL    Sodium 142 136 - 145 mmol/L    Potassium 3.3 (L) 3.5 - 5.3 mmol/L    Chloride 108 (H) 98 - 107 mmol/L    Bicarbonate 24 21 - 32 mmol/L    Anion Gap 13 10 - 20 mmol/L    Urea Nitrogen 15 6 - 23 mg/dL    Creatinine 0.91 0.50 - 1.05 mg/dL    eGFR 60 (L) >60 mL/min/1.73m*2    Calcium 10.1 8.6 - 10.6 mg/dL    Albumin 4.1 3.4 - 5.0 g/dL    Alkaline Phosphatase 87 33 - 136 U/L    Total Protein 8.4 (H) 6.4 - 8.2 g/dL    AST 24 9 - 39 U/L    Bilirubin, Total 1.4 (H) 0.0 - 1.2 mg/dL    ALT 14 7 - 45 U/L   Magnesium   Result Value Ref Range    Magnesium 1.99 1.60 - 2.40 mg/dL   Troponin I, High Sensitivity, Initial   Result Value Ref Range    Troponin I, High Sensitivity (CMC) <3 0 - 34 ng/L   Lipase   Result Value Ref Range    Lipase 68 9 - 82 U/L   Urinalysis with Reflex Culture and Microscopic   Result Value Ref Range    Color, Urine Colorless (N) Light-Yellow, Yellow, Dark-Yellow    Appearance, Urine Clear Clear    Specific Gravity, Urine 1.010 1.005 - 1.035    pH, Urine 7.5 5.0, 5.5, 6.0, 6.5, 7.0, 7.5, 8.0    Protein, Urine NEGATIVE NEGATIVE, 10 (TRACE), 20 (TRACE) mg/dL    Glucose, Urine Normal  Normal mg/dL    Blood, Urine NEGATIVE NEGATIVE mg/dL    Ketones, Urine NEGATIVE NEGATIVE mg/dL    Bilirubin, Urine NEGATIVE NEGATIVE mg/dL    Urobilinogen, Urine Normal Normal mg/dL    Nitrite, Urine NEGATIVE NEGATIVE    Leukocyte Esterase, Urine 25 Ron/uL (A) NEGATIVE   Microscopic Only, Urine   Result Value Ref Range    WBC, Urine 1-5 1-5, NONE /HPF    RBC, Urine NONE NONE, 1-2, 3-5 /HPF    Squamous Epithelial Cells, Urine 1-9 (SPARSE) Reference range not established. /HPF   ECG 12 lead   Result Value Ref Range    Ventricular Rate 105 BPM    Atrial Rate 105 BPM    MT Interval 164 ms    QRS Duration 70 ms    QT Interval 324 ms    QTC Calculation(Bazett) 428 ms    P Axis 26 degrees    R Axis 23 degrees    T Axis 26 degrees    QRS Count 17 beats    Q Onset 225 ms    P Onset 143 ms    P Offset 207 ms    T Offset 387 ms    QTC Fredericia 390 ms   Troponin, High Sensitivity, 1 Hour   Result Value Ref Range    Troponin I, High Sensitivity (CMC) 3 0 - 34 ng/L   Transthoracic Echo Complete   Result Value Ref Range    AV pk beni 1.27 m/s    AV mn grad 3 mmHg    LVOT diam 2.09 cm    MV E/A ratio 0.69     LV EF 76 %    LA vol index A/L 18.4 ml/m2    Tricuspid annular plane systolic excursion 2.3 cm    RV free wall pk S' 20.00 cm/s    Aortic Valve Area by Continuity of VTI 2.67 cm2    Aortic Valve Area by Continuity of Peak Velocity 2.90 cm2    AV pk grad 6 mmHg    LV A4C EF 78.0        Imaging Results  Imaging  CT angio chest abdomen pelvis  Result Date: 5/13/2025  1. No thoracic or abdominal aortic aneurysm or acute aortic pathology. 2. No acute abnormality of the chest, abdomen or pelvis. 3. Severe coronary artery calcifications. Please note the study is not optimized for evaluation of the coronary arteries. 4. Additional chronic findings as above.     I personally reviewed the image(s)/study and resident interpretation. I agree with the findings as stated by resident Nacho Morse. Data analyzed and images interpreted  at [facility], [place], [place].   Signed by: Nicholas Martini 5/13/2025 12:28 PM Dictation workstation:   [id number]    XR chest 1 view  Result Date: 5/13/2025  Elevated left hemidiaphragm. No evidence of acute cardiopulmonary process.     MACRO: None   Signed by: Freddy Chavez 5/13/2025 11:42 AM Dictation workstation:   [id number]      Cardiology, Vascular, and Other Imaging  ECG 12 lead  Result Date: 5/13/2025  Sinus tachycardia Otherwise normal ECG When compared with ECG of 16-OCT-2024 13:57, Borderline criteria for Inferior infarct are no longer Present Nonspecific T wave abnormality no longer evident in Anterior leads See ED provider note for full interpretation and clinical correlation Confirmed by Maryann Brown ([id number]) on 5/13/2025 8:03:32 PM    Transthoracic Echo Complete  Result Date: 5/13/2025   [facility], [address] [place] [address]                Tel [phone] and Fax [phone] TRANSTHORACIC ECHOCARDIOGRAM REPORT  Patient Name:       TARSHA EWING          Reading Physician:    [id number] Samson Brar MD Study Date:         5/13/2025           Ordering Provider:    [id number] GRACE WADE MRN/PID:            [id number]            Fellow: Accession#:         [id number]        Nurse:                Cielo Whatley RN Date of Birth/Age:  3/5/1934 / 89 years Sonographer:          Naomi Mcneill                                                               RDCS Gender assigned at  F                   Additional Staff:     Thu DHILLON Birth: Height:             162.56 cm           Admit Date:           5/13/2025 Weight:             56.70 kg            Admission Status:     Inpatient -                                                               Priority                                                                discharge BSA / BMI:          1.60 m2 / 21.46     Encounter#:           [id number]                     kg/m2 Blood Pressure:     180/111 mmHg        Department Location:  [facility]                                                               Non Invasive Study Type:    TRANSTHORACIC ECHO (TTE) COMPLETE Diagnosis/ICD: Syncope-R55 Indication:    Syncope CPT Code:      Echo Complete w Full [id number] Patient History: Pertinent History: HTN, HLD, BRCA, MI, GERD, A-fib. Study Detail: The following Echo studies were performed: 2D, M-Mode, Doppler and               color flow. Technically challenging study due to poor acoustic               windows and body habitus. Optison used as a contrast agent for               endocardial border definition. Total contrast used for this               procedure was 3 mL via IV push.  PHYSICIAN INTERPRETATION: Left Ventricle: Left ventricular ejection fraction is hyperdynamic, calculated by Rodriguez's biplane at 76%. There are no regional left ventricular wall motion abnormalities. The left ventricular cavity size is normal. There is severely increased septal thickness. Spectral Doppler shows a Grade I (impaired relaxation pattern) of left ventricular diastolic filling with normal left atrial filling pressure. Left Atrium: The left atrial size is normal. Right Ventricle: The right ventricle is normal in size. There is normal right ventricular global systolic function. Right Atrium: The right atrium is normal in size. Aortic Valve: The aortic valve is trileaflet. There is minimal aortic valve cusp calcification. The aortic valve dimensionless index is 0.80. There is no evidence of aortic valve regurgitation. The peak instantaneous gradient of the aortic valve is 6 mmHg. The mean gradient of the aortic valve is 3 mmHg. Mitral Valve: The mitral valve is normal in structure. There is trace to mild mitral valve regurgitation. Tricuspid Valve: The tricuspid valve was not well  visualized. There is trace tricuspid regurgitation. The right ventricular systolic pressure is unable to be estimated. Pulmonic Valve: The pulmonic valve is not well visualized. The pulmonic valve regurgitation was not well visualized. Pericardium: There is no pericardial effusion noted. Aorta: The aortic root is normal. There is no dilatation of the ascending aorta. There is no dilatation of the aortic root. Systemic Veins: The inferior vena cava appears normal in size, with IVC inspiratory collapse greater than 50%. In comparison to the previous echocardiogram(s): There are no prior studies on this patient for comparison purposes.  CONCLUSIONS:  1. Poorly visualized anatomical structures due to suboptimal image quality.  2. Left ventricular ejection fraction is hyperdynamic, calculated by Rodriguez's biplane at 76%.  3. Spectral Doppler shows a Grade I (impaired relaxation pattern) of left ventricular diastolic filling with normal left atrial filling pressure.  4. There is severely increased septal thickness.  5. There is normal right ventricular global systolic function. QUANTITATIVE DATA SUMMARY:  2D MEASUREMENTS:          Normal Ranges: Ao Root d:       3.20 cm  (2.0-3.7cm) LVEDV Index:     41 ml/m2  LEFT ATRIUM:                  Normal Ranges: LA Vol A4C:        27.9 ml    (22+/-6mL/m2) LA Vol A2C:        29.2 ml LA Vol BP:         29.4 ml LA Vol Index A4C:  17.4ml/m2 LA Vol Index A2C:  18.2 ml/m2 LA Vol Index BP:   18.4 ml/m2 LA Area A4C:       12.8 cm2 LA Area A2C:       12.7 cm2 LA Major Axis A4C: 5.0 cm LA Major Axis A2C: 4.7 cm LA Volume Index:   18.4 ml/m2  AORTA MEASUREMENTS:         Normal Ranges: Asc Ao, d:          3.00 cm (2.1-3.4cm)  LV SYSTOLIC FUNCTION:                      Normal Ranges: EF-A4C View:    78 % (>=55%) EF-A2C View:    74 % EF-Biplane:     76 % LV EF Reported: 76 %  LV DIASTOLIC FUNCTION:             Normal Ranges: MV Peak E:             0.63 m/s    (0.7-1.2 m/s) MV Peak A:              0.92 m/s    (0.42-0.7 m/s) E/A Ratio:             0.69        (1.0-2.2) MV A Dur:              131.49 msec MV DT:                 294 msec    (150-240 msec) PulmV Sys Erik:         38.31 cm/s PulmV [place] Erik:        21.76 cm/s PulmV S/D Erik:         1.76 PulmV A Revs Erik:      28.98 cm/s PulmV A Revs Dur:      121.11 msec  MITRAL VALVE:          Normal Ranges: MV DT:        294 msec (150-240msec)  AORTIC VALVE:                     Normal Ranges: AoV Vmax:                1.27 m/s (<=1.7m/s) AoV Peak P.5 mmHg (<20mmHg) AoV Mean P.8 mmHg (1.7-11.5mmHg) LVOT Max Erik:            1.10 m/s (<=1.1m/s) AoV VTI:                 26.20 cm (18-25cm) LVOT VTI:                20.84 cm LVOT Diameter:           2.09 cm  (1.8-2.4cm) AoV Area, VTI:           2.67 cm2 (2.5-5.5cm2) AoV Area,Vmax:           2.90 cm2 (2.5-4.5cm2) AoV Dimensionless Index: 0.80  RIGHT VENTRICLE: TAPSE: 23.0 mm RV s'  0.20 m/s  TRICUSPID VALVE/RVSP:         Normal Ranges: IVC Diam:             1.26 cm  PULMONIC VALVE:          Normal Ranges: PV Accel Time:  114 msec (>120ms) PV Max Erik:     0.8 m/s  (0.6-0.9m/s) PV Max P.6 mmHg  PULMONARY VEINS: PulmV A Revs Dur: 121.11 msec PulmV A Revs Erik: 28.98 cm/s PulmV [place] Erik:   21.76 cm/s PulmV S/D Erik:    1.76 PulmV Sys Erik:    38.31 cm/s  AORTA: Asc Ao Diam 2.97 cm  [id number] Samson Brar MD Electronically signed on 2025 at 5:02:23 PM  ** Final **         Medications:  Scheduled Medications[1]   PRN Medications[2]     Assessment/Plan     DVT Prophylaxis:  -Lovenox  -Sequential teds    Near syncope:  -Cardiac monitor  - Echo in the AM  - Carotid duplex        @OBS@    KAY Flower-CNP          [1] amLODIPine, 5 mg, oral, Daily  [2] PRN medications: acetaminophen

## 2025-05-14 NOTE — DISCHARGE SUMMARY
Disposition Note  [facility] CLINICAL DECISION  Patient: Jojo Luis  MRN: [id number]  : 3/5/1934  Date of Evaluation: 2025  ED Provider: Mario Mott PA-C      Limitations to history: None  Independent Historian: Yes  External Records Reviewed: Recent and relevant inpatient and outpatient notes in EMR      Subjective:    Jojo Luis is a 89 y.o. female has undergone comprehensive diagnostic evaluation and therapeutic management in accordance with the CDU guidelines for presyncope. Based on Mrs. Martinez`s clinical response and diagnostic information during this period of observation, it has been determined that the patient will be discharged.    The acute evaluation included:  Orders Placed This Encounter   Procedures    Urine Culture    XR chest 1 view    CT angio chest abdomen pelvis    Troponin I Series, High Sensitivity (0, 1 HR)    CBC and Auto Differential    Comprehensive Metabolic Panel    Magnesium    Urinalysis with Reflex Culture and Microscopic    Troponin I, High Sensitivity, Initial    Urinalysis with Reflex Culture and Microscopic    Extra Urine Gray Tube    Lipase    Troponin, High Sensitivity, 1 Hour    Extra Tubes    Light Blue Top    Lavender Top    SST TOP    Microscopic Only, Urine    Adult diet Regular    Vital Signs    Cardiac Monitoring - ED/ICU/PACU Only    Orthostatic vitals    POCT GLUCOSE    ECG 12 lead    Transthoracic Echo Complete    Insert and maintain peripheral IV    Initiate Observation Send to CDU         Placed in observation at: 1350       Past History   Medical History[1]  Surgical History[2]  Social History[3]      Medications/Allergies     Previous Medications    ACETAMINOPHEN (TYLENOL) 325 MG TABLET    Take 2 tablets (650 mg) by mouth every 6 hours if needed (For up to 10 days).    AMLODIPINE (NORVASC) 5 MG TABLET    TAKE 1 TABLET (5 MG) BY MOUTH ONCE DAILY.    HYDROXYZINE HCL (ATARAX) 10 MG TABLET    Take 0.5 tablets (5 mg) by mouth 3 times a day.     LOPERAMIDE (IMODIUM) 2 MG CAPSULE    Take 1 capsule (2 mg) by mouth 3 times a day as needed for diarrhea.    MINOXIDIL (ROGAINE) 2 % EXTERNAL SOLUTION    Apply topically 2 times a day.    ONDANSETRON ODT (ZOFRAN-ODT) 4 MG DISINTEGRATING TABLET    Take 1 tablet (4 mg) by mouth every 6 hours if needed for nausea or vomiting.    SIMETHICONE (MYLICON) 80 MG CHEWABLE TABLET    Chew 1 tablet (80 mg) every 6 hours if needed for flatulence (for gas).     Allergies[4]      Review of Systems  All systems reviewed and otherwise negative, except as stated above in HPI.    Diagnostics reviewed by Mario Mott PA-C     Labs:  Results for orders placed or performed during the hospital encounter of 05/13/25   POCT GLUCOSE    Collection Time: 05/13/25  9:15 AM   Result Value Ref Range    POCT Glucose 106 (H) 74 - 99 mg/dL   CBC and Auto Differential    Collection Time: 05/13/25  9:57 AM   Result Value Ref Range    WBC 4.5 4.4 - 11.3 x10*3/uL    nRBC 0.0 0.0 - 0.0 /100 WBCs    RBC 4.48 4.00 - 5.20 x10*6/uL    Hemoglobin 13.9 12.0 - 16.0 g/dL    Hematocrit 40.8 36.0 - 46.0 %    MCV 91 80 - 100 fL    MCH 31.0 26.0 - 34.0 pg    MCHC 34.1 32.0 - 36.0 g/dL    RDW 12.3 11.5 - 14.5 %    Platelets 179 150 - 450 x10*3/uL    MPV 12.2 (H) 7.5 - 11.5 fL    Neutrophils % 40.4 40.0 - 80.0 %    Immature Granulocytes %, Automated 0.9 0.0 - 0.9 %    Lymphocytes % 48.1 13.0 - 44.0 %    Monocytes % 7.3 2.0 - 10.0 %    Eosinophils % 2.2 0.0 - 6.0 %    Basophils % 1.1 0.0 - 2.0 %    Neutrophils Absolute 1.82 1.60 - 5.50 x10*3/uL    Immature Granulocytes Absolute, Automated 0.04 0.00 - 0.50 x10*3/uL    Lymphocytes Absolute 2.17 0.80 - 3.00 x10*3/uL    Monocytes Absolute 0.33 0.05 - 0.80 x10*3/uL    Eosinophils Absolute 0.10 0.00 - 0.40 x10*3/uL    Basophils Absolute 0.05 0.00 - 0.10 x10*3/uL   Comprehensive Metabolic Panel    Collection Time: 05/13/25  9:57 AM   Result Value Ref Range    Glucose 98 74 - 99 mg/dL    Sodium 142 136 - 145 mmol/L     Potassium 3.3 (L) 3.5 - 5.3 mmol/L    Chloride 108 (H) 98 - 107 mmol/L    Bicarbonate 24 21 - 32 mmol/L    Anion Gap 13 10 - 20 mmol/L    Urea Nitrogen 15 6 - 23 mg/dL    Creatinine 0.91 0.50 - 1.05 mg/dL    eGFR 60 (L) >60 mL/min/1.73m*2    Calcium 10.1 8.6 - 10.6 mg/dL    Albumin 4.1 3.4 - 5.0 g/dL    Alkaline Phosphatase 87 33 - 136 U/L    Total Protein 8.4 (H) 6.4 - 8.2 g/dL    AST 24 9 - 39 U/L    Bilirubin, Total 1.4 (H) 0.0 - 1.2 mg/dL    ALT 14 7 - 45 U/L   Magnesium    Collection Time: 05/13/25  9:57 AM   Result Value Ref Range    Magnesium 1.99 1.60 - 2.40 mg/dL   Troponin I, High Sensitivity, Initial    Collection Time: 05/13/25  9:57 AM   Result Value Ref Range    Troponin I, High Sensitivity (CMC) <3 0 - 34 ng/L   Lipase    Collection Time: 05/13/25  9:57 AM   Result Value Ref Range    Lipase 68 9 - 82 U/L   Urinalysis with Reflex Culture and Microscopic    Collection Time: 05/13/25 10:38 AM   Result Value Ref Range    Color, Urine Colorless (N) Light-Yellow, Yellow, Dark-Yellow    Appearance, Urine Clear Clear    Specific Gravity, Urine 1.010 1.005 - 1.035    pH, Urine 7.5 5.0, 5.5, 6.0, 6.5, 7.0, 7.5, 8.0    Protein, Urine NEGATIVE NEGATIVE, 10 (TRACE), 20 (TRACE) mg/dL    Glucose, Urine Normal Normal mg/dL    Blood, Urine NEGATIVE NEGATIVE mg/dL    Ketones, Urine NEGATIVE NEGATIVE mg/dL    Bilirubin, Urine NEGATIVE NEGATIVE mg/dL    Urobilinogen, Urine Normal Normal mg/dL    Nitrite, Urine NEGATIVE NEGATIVE    Leukocyte Esterase, Urine 25 Ron/uL (A) NEGATIVE   Extra Urine Gray Tube    Collection Time: 05/13/25 10:38 AM   Result Value Ref Range    Extra Tube Hold for add-ons.    Microscopic Only, Urine    Collection Time: 05/13/25 10:38 AM   Result Value Ref Range    WBC, Urine 1-5 1-5, NONE /HPF    RBC, Urine NONE NONE, 1-2, 3-5 /HPF    Squamous Epithelial Cells, Urine 1-9 (SPARSE) Reference range not established. /HPF   ECG 12 lead    Collection Time: 05/13/25 11:21 AM   Result Value Ref Range     Ventricular Rate 105 BPM    Atrial Rate 105 BPM    AZ Interval 164 ms    QRS Duration 70 ms    QT Interval 324 ms    QTC Calculation(Bazett) 428 ms    P Axis 26 degrees    R Axis 23 degrees    T Axis 26 degrees    QRS Count 17 beats    Q Onset 225 ms    P Onset 143 ms    P Offset 207 ms    T Offset 387 ms    QTC Fredericia 390 ms   Troponin, High Sensitivity, 1 Hour    Collection Time: 05/13/25 11:30 AM   Result Value Ref Range    Troponin I, High Sensitivity (CMC) 3 0 - 34 ng/L   Transthoracic Echo Complete    Collection Time: 05/13/25  5:02 PM   Result Value Ref Range    AV pk beni 1.27 m/s    AV mn grad 3 mmHg    LVOT diam 2.09 cm    MV E/A ratio 0.69     LV EF 76 %    LA vol index A/L 18.4 ml/m2    Tricuspid annular plane systolic excursion 2.3 cm    RV free wall pk S' 20.00 cm/s    Aortic Valve Area by Continuity of VTI 2.67 cm2    Aortic Valve Area by Continuity of Peak Velocity 2.90 cm2    AV pk grad 6 mmHg    LV A4C EF 78.0    Light Blue Top    Collection Time: 05/14/25  8:37 AM   Result Value Ref Range    Extra Tube 293    Lavender Top    Collection Time: 05/14/25  8:37 AM   Result Value Ref Range    Extra Tube Hold for add-ons.    SST TOP    Collection Time: 05/14/25  8:39 AM   Result Value Ref Range    Extra Tube Hold for add-ons.      Radiographs:  Vascular US Carotid Artery Duplex Bilateral   Final Result   1. Several calcified atheromatous plaques within the right carotid   bulb causing less than 50% stenosis. Normal flow pattern with no   evidence of hemodynamically significant stenosis. Several calcified   plaques within the left carotid bulb and proximal left ICA causing   less than 50% stenosis. Normal flow pattern with no evidence of   hemodynamically significant stenosis.        I personally reviewed the images/study and resident's interpretation   and I agree with the findings as stated by Gisselle Shah MD (resident   radiologist). This study was analyzed and interpreted at [facility]  "[place], [place].             The velocity criteria are extrapolated from diameter data as defined   by the Society of Radiologists in Ultrasound Consensus Conference   Radiology 2003; 229;340-346.        MACRO:   None        Signed by: Nestor Spencer 5/14/2025 10:26 AM   Dictation workstation:   [id number]      Transthoracic Echo Complete   Final Result      CT angio chest abdomen pelvis   Final Result   1. No thoracic or abdominal aortic aneurysm or acute aortic pathology.   2. No acute abnormality of the chest, abdomen or pelvis.   3. Severe coronary artery calcifications. Please note the study is   not optimized for evaluation of the coronary arteries.   4. Additional chronic findings as above.             I personally reviewed the image(s)/study and resident interpretation.   I agree with the findings as stated by resident Nacho Morse.   Data analyzed and images interpreted at [place], [place].        Signed by: Nicholas Martini 5/13/2025 12:28 PM   Dictation workstation:   [id number]      XR chest 1 view   Final Result   Elevated left hemidiaphragm. No evidence of acute cardiopulmonary   process.             MACRO:   None        Signed by: Freddy Chavez 5/13/2025 11:42 AM   Dictation workstation:   [id number]              Physical Exam     Visit Vitals  /71 (BP Location: Right arm, Patient Position: Lying)   Pulse 62   Temp 36.6 °C (97.9 °F) (Temporal)   Resp 17   Ht 1.626 m (5' 4\")   Wt 56.7 kg (125 lb)   SpO2 99%   BMI 21.46 kg/m²   Smoking Status Never   BSA 1.6 m²       Physical exam  VS: As documented in the triage note and EMR flowsheet from this visit were reviewed.    General: Patient is AAOx3, appears well developed, well nourished, is a good historian, answers questions appropriately    HEENT: head normocephalic, atraumatic, PERRLA, EOMs intact, oropharynx without erythema or exudate, buccal mucosa intact " without lesions, nose is patent bilateral.  Bilateral cerumen noted to the canals.    Neck: supple, full ROM, negative for lymphadenopathy, JVD, thyromegaly, tracheal deviation, nuchal rigidity    Pulmonary: Clear to auscultation bilaterally, No wheezing, rales, or rhonchi, no accessory muscle use, able to speak full clear sentences    Cardiac: Normal rate and rhythm, no murmurs, rubs or gallops    GI: soft, non-tender, non-distended, normoactive bowelsounds in all four quadrants, no masses or organomegaly, no guarding or CVA tenderness noted    Musculoskeletal: full weight bearing, MORALES, no joint effusions, clubbing or edema noted    Skin: Warm, dry, intact, no lesions or rashes noted, turgor is good.    Neuro: patient follow commands, cranial nerves 2-12 grossly intact, motor strengths 5/5 upper and lower extremities, sensation are symmetrical. No focal deficits.    Psych: Appropriate mood and affect for situation      Consultants  1) N/A      Impression and Plan    In summary, Jojo Luis has been cared for according to the standard [facility] Clinical Decision Unit observation protocol for Pre-syncope. This extended period of observation was specifically required to determine the need for hospitalization. Prior to discharge from observation, the final physical exam is documented above.     Significant events during the course of observation based on the goals of the clinical problem list include:   1) Remained stable  2) Neurologically intact    Based on the patient's condition and test results, the patient will be discharged    Total length of observation was 21 hours. Dr. Zimmer is the CDU disposition attending.      Discharge Diagnosis  Pre-syncope    Issues Requiring Follow-Up  See below    Discharge Meds     Your medication list        ASK your doctor about these medications        Instructions Last Dose Given Next Dose Due   acetaminophen 325 mg tablet  Commonly known as:  Tylenol           amLODIPine 5 mg tablet  Commonly known as: Norvasc      TAKE 1 TABLET (5 MG) BY MOUTH ONCE DAILY.       hydrOXYzine HCL 10 mg tablet  Commonly known as: Atarax      Take 0.5 tablets (5 mg) by mouth 3 times a day.       loperamide 2 mg capsule  Commonly known as: Imodium      Take 1 capsule (2 mg) by mouth 3 times a day as needed for diarrhea.       minoxidil 2 % external solution  Commonly known as: Rogaine      Apply topically 2 times a day.       ondansetron ODT 4 mg disintegrating tablet  Commonly known as: Zofran-ODT      Take 1 tablet (4 mg) by mouth every 6 hours if needed for nausea or vomiting.       simethicone 80 mg chewable tablet  Commonly known as: Mylicon      Chew 1 tablet (80 mg) every 6 hours if needed for flatulence (for gas).                Test Results Pending At Discharge  Pending Labs       Order Current Status    Urine Culture In process            Hospital Course   Mrs. Kaur was admitted to the clinical decision unit for near syncope.  Medical workup included EKG, laboratory studies and imaging.  Diagnostic information was obtained, reviewed and discussed with the patient.  EKG without any ischemic changes or arrhythmias. Laboratory work with no leukocytosis or anemia, mild hypokalemia at 3.3 repleted with 40 mill equivalents orally negative troponin x 2, CT angiogram chest abdomen pelvis performed due to her hypertension given the symptoms which showed no aneurysm, severe coronary artery calcifications.  She was given a dose of labetalol for her hypertension blood pressure downtrending appropriately.  Complete echocardiogram had a EF of 76%.  Bilateral carotid ultrasound revealed several bilateral calcified plaque with normal flow pattern with no evidence of hemodynamically significant stenosis. Mrs. Dobbins remained clinically, hemodynamically and neurologically intact during her CDU admission was informed of her clinical findings.  Plan to discharge home with instructions  to follow-up with her primary care provider in the next 2 to 4 weeks.  Patient be instructed to return to the emergency department with new or worsening symptoms or for any other concerns.  Plan of care was discussed with Mrs. Luis and she verbalized understanding and is in agreement and will be discharged home in stable condition.    Assessment/plan  1) Presyncope  - Follow-up with PCP next 2 to 4 weeks    2) Cerumen impaction  - Debrox earwax removal kit  - Follow-up with PCP in the next 2 to 4 weeks    Outpatient Follow-Up  Future Appointments   Date Time Provider Department Center   6/11/2025 10:00 AM Mahendra To MD [id number] [place]         Mario Mott PA-C                [1]   Past Medical History:  Diagnosis Date    Abnormal electrocardiogram (ECG) (EKG) 07/02/2013    Abnormal electrocardiogram    Biliuria 04/10/2017    Bilirubin in urine    Cerebral cysts 07/02/2013    Arachnoid cyst    Diverticulosis of intestine, part unspecified, without perforation or abscess without bleeding 07/02/2013    Diverticulosis    Impacted cerumen, bilateral 01/29/2018    Bilateral impacted cerumen    Low back pain, unspecified 04/10/2017    Low back pain    Non-ST elevation (NSTEMI) myocardial infarction (Multi) 04/10/2017    NSTEMI (non-ST elevated myocardial infarction)    Other specified diseases of anus and rectum 07/02/2013    Rectal pain    Pain in unspecified shoulder 04/10/2017    Chronic shoulder pain    Panic disorder (episodic paroxysmal anxiety) 07/02/2013    Panic disorder without agoraphobia    Personal history of other diseases of the digestive system 06/16/2021    History of small bowel obstruction    Personal history of other malignant neoplasm of large intestine     History of malignant neoplasm of colon    Personal history of other specified conditions 07/02/2013    History of insomnia    Tachycardia, unspecified 04/10/2017    Tachycardia    Unspecified osteoarthritis, unspecified site 04/10/2017     Arthritis   [2]   Past Surgical History:  Procedure Laterality Date    CHOLECYSTECTOMY  07/02/2015    Cholecystectomy    CT ANGIO NECK  9/13/2017    [place] NECK ANGIO W AND WO [place] [facility] [place]    EXPLORATORY LAPAROTOMY  07/02/2015    Exploratory Laparotomy    HYSTERECTOMY  07/02/2013    Hysterectomy    MR HEAD ANGIO WO IV CONTRAST  8/12/2022    MR HEAD ANGIO WO IV CONTRAST 8/12/2022 [facility]    MR NECK ANGIO WO IV CONTRAST  8/12/2022    [place] NECK ANGIO WO IV CONTRAST [place] [facility] [place]    OTHER SURGICAL HISTORY  06/03/2015    Right Hemicolectomy    VENTRAL HERNIA REPAIR  07/06/2015    Ventral Hernia Repair   [3]   Social History  Socioeconomic History    Marital status:    Tobacco Use    Smoking status: Never     Passive exposure: Never    Smokeless tobacco: Never   Substance and Sexual Activity    Alcohol use: Never    Drug use: Never    Sexual activity: Defer     Social Drivers of Health     Financial Resource Strain: Low Risk  (10/17/2024)    Overall Financial Resource Strain (CARDIA)     Difficulty of Paying Living Expenses: Not very hard   Transportation Needs: No Transportation Needs (10/17/2024)    PRAPARE - Transportation     Lack of Transportation (Medical): No     Lack of Transportation (Non-Medical): No   Housing Stability: Unknown (10/17/2024)    Housing Stability Vital Sign     Unable to Pay for Housing in the Last Year: No     Homeless in the Last Year: No   [4] No Known Allergies

## 2025-05-15 LAB — BACTERIA UR CULT: NORMAL

## 2025-05-21 LAB
ATRIAL RATE: 71 BPM
P AXIS: 44 DEGREES
P OFFSET: 196 MS
P ONSET: 132 MS
PR INTERVAL: 184 MS
Q ONSET: 224 MS
QRS COUNT: 11 BEATS
QRS DURATION: 72 MS
QT INTERVAL: 390 MS
QTC CALCULATION(BAZETT): 423 MS
QTC FREDERICIA: 412 MS
R AXIS: 27 DEGREES
T AXIS: 2 DEGREES
T OFFSET: 419 MS
VENTRICULAR RATE: 71 BPM

## 2025-06-11 ENCOUNTER — APPOINTMENT (OUTPATIENT)
Dept: OPHTHALMOLOGY | Facility: CLINIC | Age: OVER 89
End: 2025-06-11
Payer: COMMERCIAL

## 2025-06-17 ENCOUNTER — PATIENT OUTREACH (OUTPATIENT)
Dept: CARE COORDINATION | Facility: CLINIC | Age: OVER 89
End: 2025-06-17
Payer: COMMERCIAL

## 2025-06-17 NOTE — PROGRESS NOTES
At 30 days post-admission, the patient has no additional needs. The patient has followed up with appropriate providers and is stable with no new concerns identified.    There are no further care management needs at this time, and no hospital readmissions have occurred within the 30-day period.     Dinah Cano RN, Care Manager  [facility], General acute hospital  [phone]

## 2025-06-18 ENCOUNTER — APPOINTMENT (OUTPATIENT)
Dept: OPHTHALMOLOGY | Facility: CLINIC | Age: OVER 89
End: 2025-06-18
Payer: COMMERCIAL

## 2025-06-18 DIAGNOSIS — H35.3221 EXUDATIVE AGE-RELATED MACULAR DEGENERATION, LEFT EYE, WITH ACTIVE CHOROIDAL NEOVASCULARIZATION: Primary | ICD-10-CM

## 2025-06-18 PROCEDURE — 99214 OFFICE O/P EST MOD 30 MIN: CPT | Performed by: OPHTHALMOLOGY

## 2025-06-18 PROCEDURE — 92134 CPTRZ OPH DX IMG PST SGM RTA: CPT | Performed by: OPHTHALMOLOGY

## 2025-06-18 PROCEDURE — 67028 INJECTION EYE DRUG: CPT | Mod: LEFT SIDE | Performed by: OPHTHALMOLOGY

## 2025-06-18 ASSESSMENT — VISUAL ACUITY
OS_PH_CC+: -1
OS_CC+: -2
OS_PH_CC: 20/70
OD_CC+: -2
OS_CC: 20/80
OD_CC: 20/30
CORRECTION_TYPE: GLASSES
METHOD: SNELLEN - LINEAR

## 2025-06-18 ASSESSMENT — EXTERNAL EXAM - LEFT EYE: OS_EXAM: NORMAL

## 2025-06-18 ASSESSMENT — ENCOUNTER SYMPTOMS
CARDIOVASCULAR NEGATIVE: 0
CONSTITUTIONAL NEGATIVE: 0
RESPIRATORY NEGATIVE: 0
ENDOCRINE NEGATIVE: 0
EYES NEGATIVE: 1
MUSCULOSKELETAL NEGATIVE: 0
NEUROLOGICAL NEGATIVE: 0
HEMATOLOGIC/LYMPHATIC NEGATIVE: 0
PSYCHIATRIC NEGATIVE: 0
GASTROINTESTINAL NEGATIVE: 0
ALLERGIC/IMMUNOLOGIC NEGATIVE: 0

## 2025-06-18 ASSESSMENT — SLIT LAMP EXAM - LIDS
COMMENTS: NORMAL
COMMENTS: NORMAL

## 2025-06-18 ASSESSMENT — TONOMETRY
OS_IOP_MMHG: 13
IOP_METHOD: GOLDMANN APPLANATION
OD_IOP_MMHG: 12

## 2025-06-18 ASSESSMENT — CUP TO DISC RATIO
OD_RATIO: 0.2
OS_RATIO: 0.2

## 2025-06-18 ASSESSMENT — EXTERNAL EXAM - RIGHT EYE: OD_EXAM: NORMAL

## 2025-06-18 NOTE — PROGRESS NOTES
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

## 2025-07-18 ENCOUNTER — PHARMACY VISIT (OUTPATIENT)
Dept: PHARMACY | Facility: CLINIC | Age: OVER 89
End: 2025-07-18
Payer: MEDICAID

## 2025-07-18 PROCEDURE — RXMED WILLOW AMBULATORY MEDICATION CHARGE

## 2025-07-23 ENCOUNTER — APPOINTMENT (OUTPATIENT)
Dept: OPHTHALMOLOGY | Facility: CLINIC | Age: OVER 89
End: 2025-07-23
Payer: COMMERCIAL

## 2025-07-23 DIAGNOSIS — H35.3221 EXUDATIVE AGE-RELATED MACULAR DEGENERATION, LEFT EYE, WITH ACTIVE CHOROIDAL NEOVASCULARIZATION: Primary | ICD-10-CM

## 2025-07-23 DIAGNOSIS — H35.3112 INTERMEDIATE STAGE NONEXUDATIVE AGE-RELATED MACULAR DEGENERATION OF RIGHT EYE: ICD-10-CM

## 2025-07-23 PROCEDURE — 92134 CPTRZ OPH DX IMG PST SGM RTA: CPT | Performed by: OPHTHALMOLOGY

## 2025-07-23 PROCEDURE — 99213 OFFICE O/P EST LOW 20 MIN: CPT | Performed by: OPHTHALMOLOGY

## 2025-07-23 PROCEDURE — 67028 INJECTION EYE DRUG: CPT | Mod: LEFT SIDE | Performed by: OPHTHALMOLOGY

## 2025-07-23 ASSESSMENT — VISUAL ACUITY
OS_CC: 20/60
OS_CC+: -1
CORRECTION_TYPE: GLASSES
OD_CC+: -2
OD_CC: 20/20
METHOD: SNELLEN - LINEAR

## 2025-07-23 ASSESSMENT — ENCOUNTER SYMPTOMS
NEUROLOGICAL NEGATIVE: 0
CONSTITUTIONAL NEGATIVE: 0
HEMATOLOGIC/LYMPHATIC NEGATIVE: 0
EYES NEGATIVE: 1
ALLERGIC/IMMUNOLOGIC NEGATIVE: 0
GASTROINTESTINAL NEGATIVE: 0
MUSCULOSKELETAL NEGATIVE: 0
ENDOCRINE NEGATIVE: 0
RESPIRATORY NEGATIVE: 0
CARDIOVASCULAR NEGATIVE: 0
PSYCHIATRIC NEGATIVE: 0

## 2025-07-23 ASSESSMENT — CUP TO DISC RATIO
OD_RATIO: 0.2
OS_RATIO: 0.2

## 2025-07-23 ASSESSMENT — TONOMETRY
OD_IOP_MMHG: 14
IOP_METHOD: GOLDMANN APPLANATION
OS_IOP_MMHG: 13

## 2025-07-23 ASSESSMENT — SLIT LAMP EXAM - LIDS
COMMENTS: NORMAL
COMMENTS: NORMAL

## 2025-07-23 ASSESSMENT — EXTERNAL EXAM - LEFT EYE: OS_EXAM: NORMAL

## 2025-07-23 ASSESSMENT — EXTERNAL EXAM - RIGHT EYE: OD_EXAM: NORMAL

## 2025-07-23 NOTE — PROGRESS NOTES
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

## 2025-11-19 ENCOUNTER — APPOINTMENT (OUTPATIENT)
Dept: OPHTHALMOLOGY | Facility: CLINIC | Age: OVER 89
End: 2025-11-19
Payer: COMMERCIAL